# Patient Record
Sex: MALE | Race: WHITE | NOT HISPANIC OR LATINO | Employment: FULL TIME | URBAN - METROPOLITAN AREA
[De-identification: names, ages, dates, MRNs, and addresses within clinical notes are randomized per-mention and may not be internally consistent; named-entity substitution may affect disease eponyms.]

---

## 2017-02-13 ENCOUNTER — OFFICE VISIT (OUTPATIENT)
Dept: PEDIATRICS | Facility: CLINIC | Age: 52
End: 2017-02-13
Payer: COMMERCIAL

## 2017-02-13 VITALS
HEIGHT: 70 IN | TEMPERATURE: 98.3 F | OXYGEN SATURATION: 97 % | SYSTOLIC BLOOD PRESSURE: 138 MMHG | HEART RATE: 89 BPM | DIASTOLIC BLOOD PRESSURE: 80 MMHG | BODY MASS INDEX: 21.47 KG/M2 | WEIGHT: 150 LBS

## 2017-02-13 DIAGNOSIS — R05.9 COUGH: ICD-10-CM

## 2017-02-13 DIAGNOSIS — B34.9 VIRAL SYNDROME: Primary | ICD-10-CM

## 2017-02-13 LAB
FLUAV+FLUBV AG SPEC QL: NORMAL
FLUAV+FLUBV AG SPEC QL: NORMAL
SPECIMEN SOURCE: NORMAL

## 2017-02-13 PROCEDURE — 87804 INFLUENZA ASSAY W/OPTIC: CPT | Performed by: INTERNAL MEDICINE

## 2017-02-13 PROCEDURE — 99213 OFFICE O/P EST LOW 20 MIN: CPT | Performed by: INTERNAL MEDICINE

## 2017-02-13 NOTE — LETTER
[facility]  [address]  [address]  [place] [id number]        February 13, 2017    RE:    Miltno Gonzalez                                                                                     [address]   [facility] [address]            To whom it may concern:    Milton Gonzalez is under my professional care and was seen in the office today.  Off work due to illness on the following dates: February 13 and 14.          Sincerely,        Eldon Jimenez MD

## 2017-02-13 NOTE — PATIENT INSTRUCTIONS
Symptom management for cough and upper respiratory symptoms:    Sore throat: Warm salt water gargle as needed    Cough: Guaifenesin- an expectorant that helps to thin mucus and allow it to be cleared more easily.  Dextromethorphan helps to suppress cough.    Nasal and sinus congestion and drainage: Decongestants such as pseudoephedrine or phenylephrine help to reduce secretions and relieve stuffiness and pressure-related discomfort. Nasal saline spray can also be used to relieve sinus congestion as well.  Oxymetazoline (Afrin) spray can be used up to 3 days to manage nasal stuffiness.    Muscle aches and headache: Both acetaminophen and ibuprofen are effective-ibuprofen is slightly more effective for muscle aches and headache.  Ibuprofen should always be taken with food and plenty of fluids.    The typical course for a viral upper respiratory infection is 7-10 days. A dry cough may linger for 1- 2 weeks more. We encourage patients to call if fevers greater than 101.5 or if cough and other symptoms are much worse despite these symptomatic cares.

## 2017-02-17 ENCOUNTER — OFFICE VISIT (OUTPATIENT)
Dept: URGENT CARE | Facility: URGENT CARE | Age: 52
End: 2017-02-17
Payer: COMMERCIAL

## 2017-02-17 VITALS
HEART RATE: 80 BPM | DIASTOLIC BLOOD PRESSURE: 68 MMHG | SYSTOLIC BLOOD PRESSURE: 128 MMHG | RESPIRATION RATE: 14 BRPM | BODY MASS INDEX: 22.07 KG/M2 | TEMPERATURE: 98.5 F | WEIGHT: 153.8 LBS | OXYGEN SATURATION: 98 %

## 2017-02-17 DIAGNOSIS — R05.9 COUGH: Primary | ICD-10-CM

## 2017-02-17 PROCEDURE — 99213 OFFICE O/P EST LOW 20 MIN: CPT | Performed by: FAMILY MEDICINE

## 2017-02-17 RX ORDER — DOXYCYCLINE 100 MG/1
100 CAPSULE ORAL 2 TIMES DAILY
Qty: 20 CAPSULE | Refills: 0 | Status: SHIPPED | OUTPATIENT
Start: 2017-02-17 | End: 2017-02-27

## 2017-02-17 RX ORDER — CODEINE PHOSPHATE AND GUAIFENESIN 10; 100 MG/5ML; MG/5ML
1-2 SOLUTION ORAL EVERY 6 HOURS PRN
Qty: 120 ML | Refills: 0 | Status: SHIPPED | OUTPATIENT
Start: 2017-02-17 | End: 2018-09-19

## 2017-02-17 RX ORDER — BENZONATATE 100 MG/1
200 CAPSULE ORAL 3 TIMES DAILY PRN
Qty: 42 CAPSULE | Refills: 3 | Status: SHIPPED | OUTPATIENT
Start: 2017-02-17 | End: 2018-09-19

## 2017-02-17 NOTE — PATIENT INSTRUCTIONS
Humidifier, Steam for the lungs    follow up with the primary care provider if not better in 10 days.     Fill the printed prescription for Doxycycline in 3 days if not better.     I recommend that you quit smoking in the future.

## 2017-02-17 NOTE — PROGRESS NOTES
SUBJECTIVE:   iMlton Gonzalez is a 51 year old male smoker  presenting with a chief complaint of cough (productive of yellow mucous phlegm, coughing attacks have been severe at times, and mostly mild attacks, cough is throughout the day).  Patient did have a fever earlier this week; however, patient has not reported any more fevers.  .Patient has had a decreased appetite over the past week.    Onset of symptoms was 6 days ago.  Course of illness is worsening today. .    Current and Associated symptoms: as listed above.   Treatment measures tried include Mucinex, and Aleve (last taken this morning) and Robitussin DM.  Predisposing factors include Patient smokes.    Patent was evaluated by his primary care provider on 2/13/2017 for cough, fever, runny nose, congestion.  Lung exam was clear.  The oropharynx was mildly erythematous.  Patient's Influenza Test was negative.  Patient was diagnosed with viral syndrome and the treatment plan was symptomatic care, fluids, rest.     Past Medical History   Diagnosis Date     Rotator cuff disorder 10/15/2008     Tobacco use disorder      Current Outpatient Prescriptions   Medication Sig Dispense Refill     Naproxen Sodium (ALEVE PO)        NO ACTIVE MEDICATIONS   0     Social History   Substance Use Topics     Smoking status: Current Every Day Smoker     Packs/day: 1.00     Years: 24.00     Types: Cigarettes     Last attempt to quit: 5/30/2008     Smokeless tobacco: Never Used     Alcohol use No       ROS:  Review of systems negative except as stated above.    OBJECTIVE  :/68 (BP Location: Right arm, Patient Position: Chair, Cuff Size: Adult Regular)  Pulse 80  Temp 98.5  F (36.9  C) (Tympanic)  Resp 14  Wt 153 lb 12.8 oz (69.8 kg)  SpO2 98%  BMI 22.07 kg/m2  GENERAL APPEARANCE: healthy, alert and no distress.  No acute respiratory distress.    HENT: mouth without ulcers, erythema or lesions.    NECK: supple, nontender, no lymphadenopathy  RESP: rhonchi right mid  posterior  CV: regular rates and rhythm, normal S1 S2, no murmur noted  SKIN: pink and warm.     ASSESSMENT:  Cough  Acute Bronchitis    PLAN:  Rx:  Tessalon Perles, Cheratussin AC  Fill the printed Rx for Doxycycline if not better in 3 days.   I recommended that the patient quit smoking in the future.   See orders in Epic  Steam, Humidifier  Don't Mucinex with the Cheratussin AC  follow up with the primary care provider if not better in 10 days.     Jeyson White MD

## 2017-02-17 NOTE — NURSING NOTE
"Chief Complaint   Patient presents with     Urgent Care     Cough     pt states has been sick with a cough x 1 week. States was seen by primary last week and was negative for flu. Pt has taken mucinex and aleve for symptoms.        Initial /68 (BP Location: Right arm, Patient Position: Chair, Cuff Size: Adult Regular)  Pulse 80  Temp 98.5  F (36.9  C) (Tympanic)  Wt 153 lb 12.8 oz (69.8 kg)  SpO2 98%  BMI 22.07 kg/m2 Estimated body mass index is 22.07 kg/(m^2) as calculated from the following:    Height as of 2/13/17: 5' 10\" (1.778 m).    Weight as of this encounter: 153 lb 12.8 oz (69.8 kg).  Medication Reconciliation: unable or not appropriate to perform   Marissa Garcia CMA (AAMA) 2/17/2017 10:48 AM    "

## 2017-02-17 NOTE — LETTER
[facility]  [address]  [address]  [place] [facility] [id number] [phone]      February 17, 2017    RE:  Milton Gonzalez                                                                                                                                                       [address]   [facility] [address]            To whom it may concern:    Milton Gonzalez is under my professional care at the [facility] on February 17, 2017.  Because of his current medical illness, please excuse his absences from work on February 17 and February 18, 2017.   He  may return to work once he starts to feel better.         Sincerely,        Jeyson White MD    [facility]

## 2017-02-17 NOTE — MR AVS SNAPSHOT
After Visit Summary   2/17/2017    Milton Gonzalez    MRN: [id number]           Patient Information     Date Of Birth          1965        Visit Information        Provider Department      2/17/2017 10:45 AM Jeyson White MD [facility]        Today's Diagnoses     Cough    -  1      Care Instructions    Humidifier, Steam for the lungs    follow up with the primary care provider if not better in 10 days.     Fill the printed prescription for Doxycycline in 3 days if not better.     I recommend that you quit smoking in the future.                Follow-ups after your visit        Who to contact     If you have questions or need follow up information about today's clinic visit or your schedule please contact [facility] directly at [phone].  Normal or non-critical lab and imaging results will be communicated to you by [employer], letter or phone within 4 business days after the clinic has received the results. If you do not hear from us within 7 days, please contact the clinic through [employer] or phone. If you have a critical or abnormal lab result, we will notify you by phone as soon as possible.  Submit refill requests through [employer] or call your pharmacy and they will forward the refill request to us. Please allow 3 business days for your refill to be completed.          Additional Information About Your Visit        MyChart Information     [employer] gives you secure access to your electronic health record. If you see a primary care provider, you can also send messages to your care team and make appointments. If you have questions, please call your primary care clinic.  If you do not have a primary care provider, please call [phone] and they will assist you.        Care EveryWhere ID     This is your Care EveryWhere ID. This could be used by other organizations to access your [facility] medical records  [id number]        Your Vitals Were     Pulse Temperature  Respirations Pulse Oximetry BMI (Body Mass Index)       80 98.5  F (36.9  C) (Tympanic) 14 98% 22.07 kg/m2        Blood Pressure from Last 3 Encounters:   02/17/17 128/68   02/13/17 138/80   10/25/16 124/80    Weight from Last 3 Encounters:   02/17/17 153 lb 12.8 oz (69.8 kg)   02/13/17 150 lb (68 kg)   10/25/16 153 lb 8 oz (69.6 kg)              Today, you had the following     No orders found for display         Today's Medication Changes          These changes are accurate as of: 2/17/17 11:11 AM.  If you have any questions, ask your nurse or doctor.               Start taking these medicines.        Dose/Directions    benzonatate 100 MG capsule   Commonly known as:  TESSALON   Used for:  Cough   Started by:  Jeyson White MD        Dose:  200 mg   Take 2 capsules (200 mg) by mouth 3 times daily as needed   Quantity:  42 capsule   Refills:  3       doxycycline Monohydrate 100 MG Caps   Used for:  Cough   Started by:  Jeyson White MD        Dose:  100 mg   Take 1 capsule (100 mg) by mouth 2 times daily for 10 days   Quantity:  20 capsule   Refills:  0       guaiFENesin-codeine 100-10 MG/5ML Soln solution   Commonly known as:  ROBITUSSIN AC   Used for:  Cough   Started by:  Jeyson White MD        Dose:  1-2 tsp.   Take 5-10 mLs by mouth every 6 hours as needed for cough   Quantity:  120 mL   Refills:  0            Where to get your medicines      Some of these will need a paper prescription and others can be bought over the counter.  Ask your nurse if you have questions.     Bring a paper prescription for each of these medications     benzonatate 100 MG capsule    doxycycline Monohydrate 100 MG Caps    guaiFENesin-codeine 100-10 MG/5ML Soln solution                Primary Care Provider Office Phone # Fax #    Eldon Jimenez [phone]       [address] [place] [address]        Thank you!     Thank you for choosing [facility]  for your care.  Our goal is always to provide you with excellent care. Hearing back from our patients is one way we can continue to improve our services. Please take a few minutes to complete the written survey that you may receive in the mail after your visit with us. Thank you!             Your Updated Medication List - Protect others around you: Learn how to safely use, store and throw away your medicines at www.disposemymeds.org.          This list is accurate as of: 2/17/17 11:11 AM.  Always use your most recent med list.                   Brand Name Dispense Instructions for use    ALEVE PO          benzonatate 100 MG capsule    TESSALON    42 capsule    Take 2 capsules (200 mg) by mouth 3 times daily as needed       doxycycline Monohydrate 100 MG Caps     20 capsule    Take 1 capsule (100 mg) by mouth 2 times daily for 10 days       guaiFENesin-codeine 100-10 MG/5ML Soln solution    ROBITUSSIN AC    120 mL    Take 5-10 mLs by mouth every 6 hours as needed for cough       NO ACTIVE MEDICATIONS

## 2017-09-01 ENCOUNTER — HOSPITAL ENCOUNTER (EMERGENCY)
Facility: CLINIC | Age: 52
Discharge: HOME | End: 2017-09-01
Attending: EMERGENCY MEDICINE | Admitting: EMERGENCY MEDICINE

## 2017-09-01 VITALS
RESPIRATION RATE: 16 BRPM | TEMPERATURE: 98.2 F | DIASTOLIC BLOOD PRESSURE: 88 MMHG | BODY MASS INDEX: 22.33 KG/M2 | HEIGHT: 70 IN | OXYGEN SATURATION: 100 % | SYSTOLIC BLOOD PRESSURE: 142 MMHG | WEIGHT: 156 LBS

## 2017-09-01 DIAGNOSIS — H57.89 EYE IRRITATION: ICD-10-CM

## 2017-09-01 PROCEDURE — 99283 EMERGENCY DEPT VISIT LOW MDM: CPT

## 2017-09-01 RX ORDER — ERYTHROMYCIN 5 MG/G
1 OINTMENT OPHTHALMIC 3 TIMES DAILY
Qty: 3.5 G | Refills: 0 | Status: SHIPPED | OUTPATIENT
Start: 2017-09-01 | End: 2018-09-19

## 2017-09-01 RX ORDER — PROPARACAINE HYDROCHLORIDE 5 MG/ML
1 SOLUTION/ DROPS OPHTHALMIC ONCE
Status: DISCONTINUED | OUTPATIENT
Start: 2017-09-01 | End: 2017-09-01 | Stop reason: HOSPADM

## 2017-09-01 ASSESSMENT — ENCOUNTER SYMPTOMS
EYE PAIN: 1
EYE REDNESS: 1

## 2017-09-01 ASSESSMENT — VISUAL ACUITY
OD: 20/25
OS: 20/20

## 2017-09-01 NOTE — ED PROVIDER NOTES
"  History     Chief Complaint:  Eye Problem    HPI   Milton Gonzalez is a 51 year old male who presents to the emergency department today for evaluation of an eye problem. 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 He reports that he immediately went to the flush station and was there for around 20 to 25 minutes. The patient reports that the burning sensation has dissipated but the eye still feels irritated. The patient denies any vision change.    Allergies:  Penicillins      Medications:    The patient is currently on no regular medications.     Past Medical History:    Rotator cuff disorder   Tobacco use disorder     Past Surgical History:    Tonsillectomy    Family History:    Diabetes  Hypertension  Cerebrovascular Disease    Social History:  The patient was alone.  Smoking Status: Current Every Day Smoker  Smokeless Tobacco: Never Used  Alcohol Use: No   Marital Status:       Review of Systems   Eyes: Positive for pain and redness. Negative for visual disturbance.   All other systems reviewed and are negative.    Physical Exam   First Vitals:  BP: 146/85  Heart Rate: 71  Temp: 98.2  F (36.8  C)  Resp: 16  Height: 177.8 cm (5' 10\")  Weight: 70.8 kg (156 lb)  SpO2: 100 %    Physical Exam  General: Appears well-developed and well-nourished.   Head: No signs of trauma.   Mouth/Throat: Oropharynx is clear and moist.   Eyes: Mild injection of right eye without signs of ulceration.  PH 7.  Pupils are equal, round, and reactive to light.   Neck: Normal range of motion. No nuchal rigidity. No cervical adenopathy  CV: Normal rate and regular rhythm.    Resp: Effort normal and breath sounds normal. No respiratory distress.   MSK: Normal range of motion. no edema. No Calf tenderness.  Neuro: The patient is alert and oriented.  Speech " normal.  GCS 15  Skin: Skin is warm and dry. No rash noted.   Psych: normal mood and affect. behavior is normal.     Emergency Department Course     Interventions:  Alcaine 1% opthalmic solution 1 drop     Emergency Department Course:  Nursing notes and vitals reviewed.  1043: I performed an exam of the patient as documented above.   1056: Patient rechecked and updated.   1214: Patient rechecked and updated.   1227: Patient rechecked and updated.   I discussed the treatment plan with the patient. They expressed understanding of this plan and consented to discharge. They will be discharged home with instructions for care and follow up. In addition, the patient will return to the emergency department if their symptoms persist, worsen, if new symptoms arise or if there is any concern.  All questions were answered.   I personally reviewed the laboratory results with the Patient and answered all related questions prior to discharge.     Impression & Plan      Medical Decision Making:  Milton Gonzalez is a 51 year old gentleman who presents due to getting a chemical in his right eye.  This occurred while at work, and he did flush his eye and in an eye station for approximately 20 minutes prior to arrival.  He still felt some irritation, so he came to the ER.  On my evaluation, he did have some slight injection to the right eye, but there are no signs of any foreign body or ulceration.  I did flush the eye further with a Leonard lens, which the patient tolerated well.  Following this, the pH was normal in the eye.  In this setting, I feel he is appropriate for discharge home.  He was given a prescription for erythromycin ointment as this should help protect the cornea along with being soothing to the eye.  He was recommended a follow-up with ophthalmology, return to the ER for any new or worsening symptoms, or further concerns.    Diagnosis:    ICD-10-CM    1. Eye irritation H57.8        Disposition:  discharged to home with  below prescription    Discharge Medications:  New Prescriptions    ERYTHROMYCIN (ROMYCIN) OPHTHALMIC OINTMENT    Place 1 Application into the right eye 3 times daily     Scribe Disclosure:  I, Asiya Ratliff, am serving as a scribe at 10:43 AM on 9/1/2017 to document services personally performed by Tyrone Burns MD based on my observations and the provider's statements to me.    9/1/2017    EMERGENCY DEPARTMENT       Tyrone Burns MD  09/01/17 [id number]

## 2017-09-01 NOTE — ED AVS SNAPSHOT
Emergency Department    [address] [facility] [address]    Phone:  [phone]    Fax:  [phone]                                       Milton Gonzalez   MRN: [id number]    Department:   Emergency Department   Date of Visit:  9/1/2017           Patient Information     Date Of Birth          1965        Your diagnoses for this visit were:     Eye irritation        You were seen by Tyrone Burns MD.      Follow-up Information     Call [place], Curtis MC MD.    Specialty:  Ophthalmology    Why:  As needed    Contact information:    [facility]  [address]  [facility] [address] [phone]          Follow up with  Emergency Department.    Specialty:  EMERGENCY MEDICINE    Why:  As needed, If symptoms worsen    Contact information:    [address] [facility] [address] [phone]        Discharge Instructions         Chemical Exposure: Eye     A chemical exposure, or injury, to the eye can occur when an acid or base solution comes in contact with your eye. As soon as the chemical gets in your eye, it is very important to flush (irrigate) your eye with sterile saline solution right away. If you don't have sterile saline, it is OK to use tap water instead. Chemical exposure can cause anything from mild irritation to permanent scarring and vision loss. How serious the injury is depends on what type of chemical it was, how concentrated it was, whether it was an acid or a base, and how long it was in your eye. After you flush the eye, it is important to follow up with a doctor if you have blurred vision or pain.  It is common to have some irritation for the next 24 hours, even in mild cases. If the exposure was more serious, be sure to follow up as directed.  The pressure inside of the eye can increase (glaucoma) hours to days after a chemical eye injury. This requires prompt treatment. Watch for the symptoms described below.  Home care    You can put  a cold pack (ice in a plastic bag, wrapped in a towel) over the eye for 20 minutes at a time. This will reduce pain.    Eye drops may be prescribed to reduce irritation, inflammation, and risk of infection. If no drops were prescribed, you may use over-the-counter decongestant eye drops for irritation or redness.    You may use acetaminophen or ibuprofen to control pain, unless another medicine was prescribed. If you have chronic liver or kidney disease or have ever had a stomach ulcer or gastrointestinal bleeding, talk with your doctor before using these medicines.    If an eye patch was put on:    You may put the cold pack over the eye patch.    If you were given a follow-up appointment to have the patch removed and the eye examined, don't miss it. An eye patch should not be left in place for more than 48 hours, unless you are advised to do so by your healthcare provider.    Do not drive a motor vehicle or use machinery with the eye patch in place. It is difficult to  distance with only one eye.  Follow-up care  Follow up with your healthcare provider, or as directed.  When to seek medical advice  Call your healthcare provider right away if any of these occur.    Increased eyelid swelling    Increasing pain in the eye    Increasing redness or drainage from the eye    Failure of normal vision to return within 24 to 48 hours    Continued feeling that something is in your eye, lasting more than 48 hours  Date Last Reviewed: 6/14/2015 2000-2017 [employer]. [address] [place] [address]. All rights reserved. This information is not intended as a substitute for professional medical care. Always follow your healthcare professional's instructions.          24 Hour Appointment Hotline       To make an appointment at any [facility], call [phone] ([phone]). If you don't have a family doctor or clinic, we will help you find one. [facility] are conveniently located to  serve the needs of you and your family.             Review of your medicines      START taking        Dose / Directions Last dose taken    erythromycin ophthalmic ointment   Commonly known as:  ROMYCIN   Dose:  1 Application   Quantity:  3.5 g        Place 1 Application into the right eye 3 times daily   Refills:  0          Our records show that you are taking the medicines listed below. If these are incorrect, please call your family doctor or clinic.        Dose / Directions Last dose taken    ALEVE PO        Refills:  0        benzonatate 100 MG capsule   Commonly known as:  TESSALON   Dose:  200 mg   Quantity:  42 capsule        Take 2 capsules (200 mg) by mouth 3 times daily as needed   Refills:  3        guaiFENesin-codeine 100-10 MG/5ML Soln solution   Commonly known as:  ROBITUSSIN AC   Dose:  1-2 tsp.   Quantity:  120 mL        Take 5-10 mLs by mouth every 6 hours as needed for cough   Refills:  0        NO ACTIVE MEDICATIONS        Refills:  0                Prescriptions were sent or printed at these locations (1 Prescription)                   Other Prescriptions                Printed at Department/Unit printer (1 of 1)         erythromycin (ROMYCIN) ophthalmic ointment                Orders Needing Specimen Collection     None      Pending Results     No orders found from 8/30/2017 to 9/2/2017.            Pending Culture Results     No orders found from 8/30/2017 to 9/2/2017.            Pending Results Instructions     If you had any lab results that were not finalized at the time of your Discharge, you can call the ED Lab Result RN at [phone]. You will be contacted by this team for any positive Lab results or changes in treatment. The nurses are available 7 days a week from 10A to 6:30P.  You can leave a message 24 hours per day and they will return your call.        Test Results From Your Hospital Stay               Clinical Quality Measure: Blood Pressure Screening     Your blood pressure was  checked while you were in the emergency department today. The last reading we obtained was  BP: 146/85 . Please read the guidelines below about what these numbers mean and what you should do about them.  If your systolic blood pressure (the top number) is less than 120 and your diastolic blood pressure (the bottom number) is less than 80, then your blood pressure is normal. There is nothing more that you need to do about it.  If your systolic blood pressure (the top number) is 120-139 or your diastolic blood pressure (the bottom number) is 80-89, your blood pressure may be higher than it should be. You should have your blood pressure rechecked within a year by a primary care provider.  If your systolic blood pressure (the top number) is 140 or greater or your diastolic blood pressure (the bottom number) is 90 or greater, you may have high blood pressure. High blood pressure is treatable, but if left untreated over time it can put you at risk for heart attack, stroke, or kidney failure. You should have your blood pressure rechecked by a primary care provider within the next 4 weeks.  If your provider in the emergency department today gave you specific instructions to follow-up with your doctor or provider even sooner than that, you should follow that instruction and not wait for up to 4 weeks for your follow-up visit.        Thank you for choosing [facility]       Thank you for choosing [facility] for your care. Our goal is always to provide you with excellent care. Hearing back from our patients is one way we can continue to improve our services. Please take a few minutes to complete the written survey that you may receive in the mail after you visit with us. Thank you!        [employer] Information     [email or web address] gives you secure access to your electronic health record. If you see a primary care provider, you can also send messages to your care team and make appointments. If you have questions, please call your primary care  clinic.  If you do not have a primary care provider, please call [phone] and they will assist you.        Care EveryWhere ID     This is your Care EveryWhere ID. This could be used by other organizations to access your [facility] medical records  [id number]        Equal Access to Services     ACACIA STOVER : Anmol Diaz, melody busby, etelvina greeralcarlos marie, thang cole. So [facility] [phone].    ATENCIÓN: Si habla español, tiene a lorenzana disposición servicios gratuitos de asistencia lingüística. Llame al [phone].    We comply with applicable federal civil rights laws and [place] laws. We do not discriminate on the basis of race, color, national origin, age, disability sex, sexual orientation or gender identity.            After Visit Summary       This is your record. Keep this with you and show to your community pharmacist(s) and doctor(s) at your next visit.

## 2017-09-01 NOTE — ED AVS SNAPSHOT
Emergency Department    [address] [place] [address]    Phone:  [phone]    Fax:  [phone]                                       Milton Gonzalez   MRN: [id number]    Department:   Emergency Department   Date of Visit:  9/1/2017           After Visit Summary Signature Page     I have received my discharge instructions, and my questions have been answered. I have discussed any challenges I see with this plan with the nurse or doctor.    ..........................................................................................................................................  Patient/Patient Representative Signature      ..........................................................................................................................................  Patient Representative Print Name and Relationship to Patient    ..................................................               ................................................  Date                                            Time    ..........................................................................................................................................  Reviewed by Signature/Title    ...................................................              ..............................................  Date                                                            Time

## 2017-09-01 NOTE — DISCHARGE INSTRUCTIONS
Chemical Exposure: Eye     A chemical exposure, or injury, to the eye can occur when an acid or base solution comes in contact with your eye. As soon as the chemical gets in your eye, it is very important to flush (irrigate) your eye with sterile saline solution right away. If you don't have sterile saline, it is OK to use tap water instead. Chemical exposure can cause anything from mild irritation to permanent scarring and vision loss. How serious the injury is depends on what type of chemical it was, how concentrated it was, whether it was an acid or a base, and how long it was in your eye. After you flush the eye, it is important to follow up with a doctor if you have blurred vision or pain.  It is common to have some irritation for the next 24 hours, even in mild cases. If the exposure was more serious, be sure to follow up as directed.  The pressure inside of the eye can increase (glaucoma) hours to days after a chemical eye injury. This requires prompt treatment. Watch for the symptoms described below.  Home care    You can put a cold pack (ice in a plastic bag, wrapped in a towel) over the eye for 20 minutes at a time. This will reduce pain.    Eye drops may be prescribed to reduce irritation, inflammation, and risk of infection. If no drops were prescribed, you may use over-the-counter decongestant eye drops for irritation or redness.    You may use acetaminophen or ibuprofen to control pain, unless another medicine was prescribed. If you have chronic liver or kidney disease or have ever had a stomach ulcer or gastrointestinal bleeding, talk with your doctor before using these medicines.    If an eye patch was put on:    You may put the cold pack over the eye patch.    If you were given a follow-up appointment to have the patch removed and the eye examined, don't miss it. An eye patch should not be left in place for more than 48 hours, unless you are advised to do so by your healthcare provider.    Do not  drive a motor vehicle or use machinery with the eye patch in place. It is difficult to  distance with only one eye.  Follow-up care  Follow up with your healthcare provider, or as directed.  When to seek medical advice  Call your healthcare provider right away if any of these occur.    Increased eyelid swelling    Increasing pain in the eye    Increasing redness or drainage from the eye    Failure of normal vision to return within 24 to 48 hours    Continued feeling that something is in your eye, lasting more than 48 hours  Date Last Reviewed: 6/14/2015 2000-2017 [employer] [address] [place] [address]. All rights reserved. This information is not intended as a substitute for professional medical care. Always follow your healthcare professional's instructions.

## 2018-07-02 ENCOUNTER — OFFICE VISIT (OUTPATIENT)
Dept: URGENT CARE | Facility: URGENT CARE | Age: 53
End: 2018-07-02
Payer: OTHER MISCELLANEOUS

## 2018-07-02 VITALS
DIASTOLIC BLOOD PRESSURE: 92 MMHG | SYSTOLIC BLOOD PRESSURE: 151 MMHG | RESPIRATION RATE: 16 BRPM | OXYGEN SATURATION: 98 % | HEART RATE: 67 BPM | TEMPERATURE: 97.7 F

## 2018-07-02 DIAGNOSIS — S09.90XA INJURY OF HEAD, INITIAL ENCOUNTER: Primary | ICD-10-CM

## 2018-07-02 DIAGNOSIS — S01.01XA LACERATION OF SCALP, INITIAL ENCOUNTER: ICD-10-CM

## 2018-07-02 PROCEDURE — 12001 RPR S/N/AX/GEN/TRNK 2.5CM/<: CPT | Performed by: FAMILY MEDICINE

## 2018-07-02 PROCEDURE — 99213 OFFICE O/P EST LOW 20 MIN: CPT | Mod: 25 | Performed by: FAMILY MEDICINE

## 2018-07-02 NOTE — LETTER
[facility]  [address] [id number] [phone]      July 2, 2018    RE:  Milton Gonzalez                                                                                                                                                       [address]   [facility] [address]            To whom it may concern:    Milton Gonzalez is under my professional care for    Injury of head, initial encounter  Laceration of scalp, initial encounter.   He  may return to work with the following: No working or lifting restrictions on or about 7-3-18.          Sincerely,        Wing Isaacs    [facility]

## 2018-07-02 NOTE — NURSING NOTE
The following medication was given:     MEDICATION: lidocaine  4%  ROUTE: Topical gel   SITE: Head   DOSE: 1.5 mL  LOT #: [id number]  :  [employer]   EXPIRATION DATE:  07/19/2018  NDC#: 6551-4211-55

## 2018-07-02 NOTE — PROGRESS NOTES
SUBJECTIVE: @[email or web address] who presents to the clinic with a laceration on the hand and scalp sustained 1 hour(s) ago.    This is a work related and accidental injury.    Mechanism of injury: blunt trauma (contusion).  Associated symptoms: Denies numbness, weakness, or loss of function  Last tetanus booster within 10 years: yes    Past Medical History:   Diagnosis Date     Rotator cuff disorder 10/15/2008     Tobacco use disorder      Allergies   Allergen Reactions     Penicillins      As child     Social History   Substance Use Topics     Smoking status: Current Every Day Smoker     Packs/day: 1.00     Years: 24.00     Types: Cigarettes     Last attempt to quit: 5/30/2008     Smokeless tobacco: Never Used     Alcohol use No       ROS:  Neuro: good distal sensation  Motor: normal rom and strenght  Hem: capillary refill < 2 sec    EXAM: The patient appears today in alert,no apparent distress distressVITALS:   BP (!) 151/92  Pulse 67  Temp 97.7  F (36.5  C) (Tympanic)  Resp 16  SpO2 98%  Size of laceration: 2 centimeters  Characteristics of the laceration: clean and straight  Tendon function intact: yes  Sensation to light touch intact: yes  Pulses/Capillary refill intact: yes      ICD-10-CM    1. Injury of head, initial encounter S09.90XA    2. Laceration of scalp, initial encounter S01.01XA REPAIR SUPERFICIAL, WOUND BODY < =2.5CM       Procedure Note:LET applied  Good anesthesia was obtainedPrepped and draped in the usual sterile fashion  Wound cleaned with sterile waterLaceration was closed using 4 staples  interrupted sutures  After care instructions:Keep wound clean   Staples out in 10 days  Signs of infection discussed today

## 2018-07-02 NOTE — MR AVS SNAPSHOT
After Visit Summary   7/2/2018    Milton Gonzalez    MRN: [id number]           Patient Information     Date Of Birth          1965        Visit Information        Provider Department      7/2/2018 12:20 PM Wing Isaacs,  [facility]        Today's Diagnoses     Injury of head, initial encounter    -  1    Laceration of scalp, initial encounter           Follow-ups after your visit        Who to contact     If you have questions or need follow up information about today's clinic visit or your schedule please contact [facility] directly at [phone].  Normal or non-critical lab and imaging results will be communicated to you by impokhart, letter or phone within 4 business days after the clinic has received the results. If you do not hear from us within 7 days, please contact the clinic through impokhart or phone. If you have a critical or abnormal lab result, we will notify you by phone as soon as possible.  Submit refill requests through [employer] or call your pharmacy and they will forward the refill request to us. Please allow 3 business days for your refill to be completed.          Additional Information About Your Visit        MyChart Information     [employer] gives you secure access to your electronic health record. If you see a primary care provider, you can also send messages to your care team and make appointments. If you have questions, please call your primary care clinic.  If you do not have a primary care provider, please call [phone] and they will assist you.        Care EveryWhere ID     This is your Care EveryWhere ID. This could be used by other organizations to access your [facility] medical records  [id number]        Your Vitals Were     Pulse Temperature Respirations Pulse Oximetry          67 97.7  F (36.5  C) (Tympanic) 16 98%         Blood Pressure from Last 3 Encounters:   07/02/18 (!) 151/92   09/01/17 142/88    02/17/17 128/68    Weight from Last 3 Encounters:   09/01/17 156 lb (70.8 kg)   02/17/17 153 lb 12.8 oz (69.8 kg)   02/13/17 150 lb (68 kg)              Today, you had the following     No orders found for display       Primary Care Provider Office Phone # Fax #    Eldon Jimenez [phone] [address] [place] [address]        Equal Access to Services     [facility] : Hadii aad ku hadasho Soomaali, waaxda luqadaha, qaybta kaalmada adeegyada, waxay idiin hayaan adeeg kharash la'aan ah. So [facility] [phone].    ATENCIÓN: Si habla español, tiene a lorenzana disposición servicios gratuitos de asistencia lingüística. [facility] [phone].    We comply with applicable federal civil rights laws and [place] laws. We do not discriminate on the basis of race, color, national origin, age, disability, sex, sexual orientation, or gender identity.            Thank you!     Thank you for choosing [facility]  for your care. Our goal is always to provide you with excellent care. Hearing back from our patients is one way we can continue to improve our services. Please take a few minutes to complete the written survey that you may receive in the mail after your visit with us. Thank you!             Your Updated Medication List - Protect others around you: Learn how to safely use, store and throw away your medicines at www.disposemymeds.org.          This list is accurate as of 7/2/18  1:05 PM.  Always use your most recent med list.                   Brand Name Dispense Instructions for use Diagnosis    ALEVE PO           benzonatate 100 MG capsule    TESSALON    42 capsule    Take 2 capsules (200 mg) by mouth 3 times daily as needed    Cough       erythromycin ophthalmic ointment    ROMYCIN    3.5 g    Place 1 Application into the right eye 3 times daily        guaiFENesin-codeine 100-10 MG/5ML Soln solution    ROBITUSSIN AC    120 mL    Take 5-10 mLs by mouth  every 6 hours as needed for cough    Cough       NO ACTIVE MEDICATIONS

## 2018-07-13 ENCOUNTER — OFFICE VISIT (OUTPATIENT)
Dept: URGENT CARE | Facility: URGENT CARE | Age: 53
End: 2018-07-13
Payer: OTHER MISCELLANEOUS

## 2018-07-13 VITALS
TEMPERATURE: 98.2 F | HEART RATE: 88 BPM | WEIGHT: 149 LBS | DIASTOLIC BLOOD PRESSURE: 68 MMHG | RESPIRATION RATE: 16 BRPM | SYSTOLIC BLOOD PRESSURE: 108 MMHG | BODY MASS INDEX: 21.38 KG/M2

## 2018-07-13 DIAGNOSIS — Z48.02 ENCOUNTER FOR STAPLE REMOVAL: Primary | ICD-10-CM

## 2018-07-13 PROCEDURE — 99024 POSTOP FOLLOW-UP VISIT: CPT | Performed by: FAMILY MEDICINE

## 2018-07-13 NOTE — MR AVS SNAPSHOT
After Visit Summary   7/13/2018    Milton Gonzalez    MRN: [id number]           Patient Information     Date Of Birth          1965        Visit Information        Provider Department      7/13/2018 12:40 PM Wild Paz MD [facility]        Today's Diagnoses     Encounter for staple removal    -  1       Follow-ups after your visit        Who to contact     If you have questions or need follow up information about today's clinic visit or your schedule please contact [facility] directly at [phone].  Normal or non-critical lab and imaging results will be communicated to you by [employer] Media[employer], letter or phone within 4 business days after the clinic has received the results. If you do not hear from us within 7 days, please contact the clinic through [employer] or phone. If you have a critical or abnormal lab result, we will notify you by phone as soon as possible.  Submit refill requests through [employer] or call your pharmacy and they will forward the refill request to us. Please allow 3 business days for your refill to be completed.          Additional Information About Your Visit        MyChart Information     [employer] gives you secure access to your electronic health record. If you see a primary care provider, you can also send messages to your care team and make appointments. If you have questions, please call your primary care clinic.  If you do not have a primary care provider, please call [phone] and they will assist you.        Care EveryWhere ID     This is your Care EveryWhere ID. This could be used by other organizations to access your [facility] medical records  [id number]        Your Vitals Were     Pulse Temperature Respirations BMI (Body Mass Index)          88 98.2  F (36.8  C) (Tympanic) 16 21.38 kg/m2         Blood Pressure from Last 3 Encounters:   07/13/18 108/68   07/02/18 (!) 151/92   09/01/17 142/88    Weight from Last 3  Encounters:   07/13/18 149 lb (67.6 kg)   09/01/17 156 lb (70.8 kg)   02/17/17 153 lb 12.8 oz (69.8 kg)              Today, you had the following     No orders found for display       Primary Care Provider Office Phone # Fax #    Eldon Jimenez [phone] [address] [place] [address]        Equal Access to Services     [facility] : Hadii aad ku hadasho Soomaali, waaxda luqadaha, qaybta kaalmada adeegyada, waxay idiin hayaan adeeg kharash lakathi . So [facility] [phone].    ATENCIÓN: Si habla español, tiene a lorenzana disposición servicios gratuitos de asistencia lingüística. Llame al [phone].    We comply with applicable federal civil rights laws and [place] laws. We do not discriminate on the basis of race, color, national origin, age, disability, sex, sexual orientation, or gender identity.            Thank you!     Thank you for choosing [facility]  for your care. Our goal is always to provide you with excellent care. Hearing back from our patients is one way we can continue to improve our services. Please take a few minutes to complete the written survey that you may receive in the mail after your visit with us. Thank you!             Your Updated Medication List - Protect others around you: Learn how to safely use, store and throw away your medicines at www.disposemymeds.org.          This list is accurate as of 7/13/18 11:59 PM.  Always use your most recent med list.                   Brand Name Dispense Instructions for use Diagnosis    ALEVE PO           benzonatate 100 MG capsule    TESSALON    42 capsule    Take 2 capsules (200 mg) by mouth 3 times daily as needed    Cough       erythromycin ophthalmic ointment    ROMYCIN    3.5 g    Place 1 Application into the right eye 3 times daily        guaiFENesin-codeine 100-10 MG/5ML Soln solution    ROBITUSSIN AC    120 mL    Take 5-10 mLs by mouth every 6 hours as needed for cough     Cough       NO ACTIVE MEDICATIONS

## 2018-07-19 NOTE — PROGRESS NOTES
SUBJECTIVE:  Milton Gonzalez is a 52 year old male complains of sutures that had been in his head for 10 days.    He is having minimal pain some itching no drainage.  He is not having any fever chills .    OBJECTIVE:  /68  Pulse 88  Temp 98.2  F (36.8  C) (Tympanic)  Resp 16  Wt 149 lb (67.6 kg)  BMI 21.38 kg/m2  Exam; his examination today shows that he is suture in place top of his  Head    ASSESSMENT:  1.  Staple removal staples have been in for greater than 10 days     No evidence of infection wound has been healing well    PLAN:  1.  Taken to the procedure room there the area was cleaned prepped and we removed 4 staples without problem.

## 2018-09-19 ENCOUNTER — OFFICE VISIT (OUTPATIENT)
Dept: PEDIATRICS | Facility: CLINIC | Age: 53
End: 2018-09-19
Payer: COMMERCIAL

## 2018-09-19 VITALS
DIASTOLIC BLOOD PRESSURE: 78 MMHG | HEART RATE: 60 BPM | BODY MASS INDEX: 21.47 KG/M2 | HEIGHT: 70 IN | TEMPERATURE: 98.3 F | WEIGHT: 150 LBS | SYSTOLIC BLOOD PRESSURE: 132 MMHG

## 2018-09-19 DIAGNOSIS — F17.200 TOBACCO USE DISORDER: ICD-10-CM

## 2018-09-19 DIAGNOSIS — Z12.11 SCREENING FOR COLON CANCER: ICD-10-CM

## 2018-09-19 DIAGNOSIS — Z12.5 SCREENING FOR PROSTATE CANCER: ICD-10-CM

## 2018-09-19 DIAGNOSIS — Z23 NEED FOR PROPHYLACTIC VACCINATION AND INOCULATION AGAINST INFLUENZA: ICD-10-CM

## 2018-09-19 DIAGNOSIS — Z00.00 ENCOUNTER FOR ROUTINE ADULT HEALTH EXAMINATION WITHOUT ABNORMAL FINDINGS: Primary | ICD-10-CM

## 2018-09-19 DIAGNOSIS — Z13.6 CARDIOVASCULAR SCREENING; LDL GOAL LESS THAN 160: ICD-10-CM

## 2018-09-19 DIAGNOSIS — Z23 NEED FOR TETANUS BOOSTER: ICD-10-CM

## 2018-09-19 PROCEDURE — 90472 IMMUNIZATION ADMIN EACH ADD: CPT | Performed by: INTERNAL MEDICINE

## 2018-09-19 PROCEDURE — 80048 BASIC METABOLIC PNL TOTAL CA: CPT | Performed by: INTERNAL MEDICINE

## 2018-09-19 PROCEDURE — 83718 ASSAY OF LIPOPROTEIN: CPT | Performed by: INTERNAL MEDICINE

## 2018-09-19 PROCEDURE — 99213 OFFICE O/P EST LOW 20 MIN: CPT | Mod: 25 | Performed by: INTERNAL MEDICINE

## 2018-09-19 PROCEDURE — 90715 TDAP VACCINE 7 YRS/> IM: CPT | Performed by: INTERNAL MEDICINE

## 2018-09-19 PROCEDURE — 90471 IMMUNIZATION ADMIN: CPT | Performed by: INTERNAL MEDICINE

## 2018-09-19 PROCEDURE — 82465 ASSAY BLD/SERUM CHOLESTEROL: CPT | Performed by: INTERNAL MEDICINE

## 2018-09-19 PROCEDURE — 36415 COLL VENOUS BLD VENIPUNCTURE: CPT | Performed by: INTERNAL MEDICINE

## 2018-09-19 PROCEDURE — G0103 PSA SCREENING: HCPCS | Performed by: INTERNAL MEDICINE

## 2018-09-19 PROCEDURE — 90686 IIV4 VACC NO PRSV 0.5 ML IM: CPT | Performed by: INTERNAL MEDICINE

## 2018-09-19 PROCEDURE — 99396 PREV VISIT EST AGE 40-64: CPT | Mod: 25 | Performed by: INTERNAL MEDICINE

## 2018-09-19 RX ORDER — BUPROPION HYDROCHLORIDE 150 MG/1
TABLET, EXTENDED RELEASE ORAL
Qty: 60 TABLET | Refills: 2 | Status: SHIPPED | OUTPATIENT
Start: 2018-09-19 | End: 2019-10-15

## 2018-09-19 ASSESSMENT — ENCOUNTER SYMPTOMS
PALPITATIONS: 0
DYSURIA: 0
HEMATURIA: 0
JOINT SWELLING: 1
COUGH: 0
CONSTIPATION: 0
NERVOUS/ANXIOUS: 0
EYE PAIN: 0
FEVER: 0
NAUSEA: 0
WEAKNESS: 0
SHORTNESS OF BREATH: 0
DIZZINESS: 0
DIARRHEA: 0
HEADACHES: 0
PARESTHESIAS: 0
CHILLS: 0
FREQUENCY: 1
SORE THROAT: 0
HEMATOCHEZIA: 0
MYALGIAS: 0
ABDOMINAL PAIN: 0

## 2018-09-19 NOTE — PROGRESS NOTES
SUBJECTIVE:   CC: Milton Gonzalez is an 52 year old male who presents for preventative health visit.     Physical   Annual:     Getting at least 3 servings of Calcium per day:  NO    Bi-annual eye exam:  Yes    Dental care twice a year:  NO    Sleep apnea or symptoms of sleep apnea:  None    Diet:  Regular (no restrictions)    Frequency of exercise:  1 day/week    Duration of exercise:  45-60 minutes    Taking medications regularly:  Yes    Medication side effects:  Not applicable    Additional concerns today:  YES    Urinary frequency, no nocturia.  Drinks 5-6 cups coffee daily    Today's PHQ-2 Score:   PHQ-2 ( 1999 Pfizer) 9/19/2018   Q1: Little interest or pleasure in doing things 1   Q2: Feeling down, depressed or hopeless 0   PHQ-2 Score 1   Q1: Little interest or pleasure in doing things Several days   Q2: Feeling down, depressed or hopeless Not at all   PHQ-2 Score 1       Abuse: Current or Past(Physical, Sexual or Emotional)- No  Do you feel safe in your environment - Yes    Social History   Substance Use Topics     Smoking status: Current Every Day Smoker     Packs/day: 1.00     Years: 24.00     Types: Cigarettes     Last attempt to quit: 5/30/2008     Smokeless tobacco: Never Used     Alcohol use No     Alcohol Use 9/19/2018   If you drink alcohol do you typically have greater than 3 drinks per day OR greater than 7 drinks per week? Not Applicable       Last PSA: No results found for: PSA    Reviewed orders with patient. Reviewed health maintenance and updated orders accordingly - Yes  Labs reviewed in EPIC    Reviewed and updated as needed this visit by clinical staff  Tobacco  Allergies  Meds         Reviewed and updated as needed this visit by Provider        Patient Active Problem List   Diagnosis     Tobacco use disorder     CARDIOVASCULAR SCREENING; LDL GOAL LESS THAN 160     Chronic low back pain     Past Medical History:   Diagnosis Date     Rotator cuff disorder 10/15/2008     Tobacco use  "disorder        Past Surgical History:   Procedure Laterality Date     ENT SURGERY      tonsils     NO HISTORY OF SURGERY         Family History   Problem Relation Age of Onset     Diabetes Father      Cancer - colorectal No family hx of      Hypertension Mother      Cerebrovascular Disease Maternal Grandfather      Cerebrovascular Disease Maternal Grandmother      Prostate Cancer No family hx of        ALLERGIES     Allergies   Allergen Reactions     Penicillins      As child         Review of Systems   Constitutional: Negative for chills and fever.   HENT: Negative for congestion, ear pain, hearing loss and sore throat.    Eyes: Negative for pain and visual disturbance.   Respiratory: Negative for cough and shortness of breath.    Cardiovascular: Negative for chest pain, palpitations and peripheral edema.   Gastrointestinal: Negative for abdominal pain, constipation, diarrhea, hematochezia and nausea.   Genitourinary: Positive for frequency and urgency. Negative for discharge, dysuria, genital sores, hematuria and impotence.   Musculoskeletal: Positive for joint swelling. Negative for myalgias.   Skin: Negative for rash.   Neurological: Negative for dizziness, weakness, headaches and paresthesias.   Psychiatric/Behavioral: Negative for mood changes. The patient is not nervous/anxious.        OBJECTIVE:   /78  Pulse 60  Temp 98.3  F (36.8  C) (Oral)  Ht 5' 10\" (1.778 m)  Wt 150 lb (68 kg)  BMI 21.52 kg/m2    Physical Exam  GENERAL: healthy, alert and no distress  EYES: Eyes grossly normal to inspection, PERRL and conjunctivae and sclerae normal  HENT: ear canals and TM's normal, nose and mouth without ulcers or lesions  NECK: no adenopathy, no asymmetry, masses, or scars and thyroid normal to palpation  RESP: lungs clear to auscultation - no rales, rhonchi or wheezes  CV: regular rate and rhythm, normal S1 S2, no S3 or S4, no murmur, click or rub, no peripheral edema and peripheral pulses " "strong  ABDOMEN: soft, nontender, no hepatosplenomegaly, no masses and bowel sounds normal  MS: no gross musculoskeletal defects noted, no edema  SKIN: no suspicious lesions or rashes  NEURO: Normal strength and tone, mentation intact and speech normal  PSYCH: mentation appears normal, affect normal/bright  Prostate: smooth, mildly enlarged    ASSESSMENT/PLAN:       ICD-10-CM    1. Encounter for routine adult health examination without abnormal findings Z00.00        2. Tobacco use disorder F17.200 buPROPion (WELLBUTRIN SR) 150 MG 12 hr tablet      Would like to quit.  Start zyban, set quit date Medication side effects discussed.      3. Need for prophylactic vaccination and inoculation against influenza Z23 FLU VACCINE, SPLIT VIRUS, IM (QUADRIVALENT) [66823]- >3 YRS     Vaccine Administration, Initial [66852]     4. Need for tetanus booster Z23 TDAP, IM (10 - 64 YRS) - Adacel     5. Screening for colon cancer Z12.11 GASTROENTEROLOGY ADULT REF PROCEDURE ONLY  Due for colonoscopy     6. Screening for prostate cancer Z12.5 PSA, screen     7. CARDIOVASCULAR SCREENING; LDL GOAL LESS THAN 160 Z13.6 Cholesterol     HDL cholesterol     Basic metabolic panel     Urinary frequency.  rec reduce fluid intake and eliminate caffeine over the next few weeks, follow-up if sx persist    COUNSELING:   Reviewed preventive health counseling, as reflected in patient instructions       Regular exercise       Healthy diet/nutrition       Colon cancer screening       Prostate cancer screening    BP Readings from Last 1 Encounters:   09/19/18 132/78     Estimated body mass index is 21.52 kg/(m^2) as calculated from the following:    Height as of this encounter: 5' 10\" (1.778 m).    Weight as of this encounter: 150 lb (68 kg).           reports that he has been smoking Cigarettes.  He has a 24.00 pack-year smoking history. He has never used smokeless tobacco.  Tobacco Cessation Action Plan: Pharmacotherapies : Zyban/Wellbutrin    Counseling " Resources:  ATP IV Guidelines  Pooled Cohorts Equation Calculator  FRAX Risk Assessment  ICSI Preventive Guidelines  Dietary Guidelines for Americans, 2010  [employer]'s MyPlate  ASA Prophylaxis  Lung CA Screening    Eldon Jimenez MD, MD  [facility] [place]    Injectable Influenza Immunization Documentation    1.  Is the person to be vaccinated sick today?   No    2. Does the person to be vaccinated have an allergy to a component   of the vaccine?   No  Egg Allergy Algorithm Link    3. Has the person to be vaccinated ever had a serious reaction   to influenza vaccine in the past?   No    4. Has the person to be vaccinated ever had Guillain-Barré syndrome?   No    Form completed by YUE Russell

## 2018-09-19 NOTE — PATIENT INSTRUCTIONS
INSTRUCTIONS FOR TODAY:     set your quit date, start bupropion (Zyban) 1 week prior   [facility] will call to schedule colonoscopy     Dr Jimenez    Preventive Health Recommendations  Male Ages 50 - 64    Yearly exam:             See your health care provider every year in order to  o   Review health changes.   o   Discuss preventive care.    o   Review your medicines if your doctor has prescribed any.     Have a cholesterol test every 5 years, or more frequently if you are at risk for high cholesterol/heart disease.     Have a diabetes test (fasting glucose) every three years. If you are at risk for diabetes, you should have this test more often.     Have a colonoscopy at age 50, or have a yearly FIT test (stool test). These exams will check for colon cancer.      Talk with your health care provider about whether or not a prostate cancer screening test (PSA) is right for you.    You should be tested each year for STDs (sexually transmitted diseases), if you re at risk.     Shots: Get a flu shot each year. Get a tetanus shot every 10 years.     Nutrition:    Eat at least 5 servings of fruits and vegetables daily.     Eat whole-grain bread, whole-wheat pasta and brown rice instead of white grains and rice.     Get adequate Calcium and Vitamin D.     Lifestyle    Exercise for at least 150 minutes a week (30 minutes a day, 5 days a week). This will help you control your weight and prevent disease.     Limit alcohol to one drink per day.     No smoking.     Wear sunscreen to prevent skin cancer.     See your dentist every six months for an exam and cleaning.     See your eye doctor every 1 to 2 years.

## 2018-09-19 NOTE — MR AVS SNAPSHOT
After Visit Summary   9/19/2018    Milton Gonzalez    MRN: [id number]           Patient Information     Date Of Birth          1965        Visit Information        Provider Department      9/19/2018 1:40 PM Eldon Jimenez MD [facility]        Today's Diagnoses     Encounter for routine adult health examination without abnormal findings    -  1    Need for prophylactic vaccination and inoculation against influenza        Tobacco use disorder        Need for tetanus booster        Screening for colon cancer        Screening for prostate cancer        CARDIOVASCULAR SCREENING; LDL GOAL LESS THAN 160          Care Instructions    INSTRUCTIONS FOR TODAY:     set your quit date, start bupropion (Zyban) 1 week prior   [facility] will call to schedule colonoscopy     Dr Jimenez    Preventive Health Recommendations  Male Ages 50 - 64    Yearly exam:             See your health care provider every year in order to  o   Review health changes.   o   Discuss preventive care.    o   Review your medicines if your doctor has prescribed any.     Have a cholesterol test every 5 years, or more frequently if you are at risk for high cholesterol/heart disease.     Have a diabetes test (fasting glucose) every three years. If you are at risk for diabetes, you should have this test more often.     Have a colonoscopy at age 50, or have a yearly FIT test (stool test). These exams will check for colon cancer.      Talk with your health care provider about whether or not a prostate cancer screening test (PSA) is right for you.    You should be tested each year for STDs (sexually transmitted diseases), if you re at risk.     Shots: Get a flu shot each year. Get a tetanus shot every 10 years.     Nutrition:    Eat at least 5 servings of fruits and vegetables daily.     Eat whole-grain bread, whole-wheat pasta and brown rice instead of white grains and rice.     Get adequate Calcium and Vitamin D.      Lifestyle    Exercise for at least 150 minutes a week (30 minutes a day, 5 days a week). This will help you control your weight and prevent disease.     Limit alcohol to one drink per day.     No smoking.     Wear sunscreen to prevent skin cancer.     See your dentist every six months for an exam and cleaning.     See your eye doctor every 1 to 2 years.            Follow-ups after your visit        Additional Services     GASTROENTEROLOGY ADULT REF PROCEDURE ONLY       Last Lab Result: Creatinine (mg/dL)       Date                     Value                 12/09/2014               0.99             ----------  Body mass index is 21.52 kg/(m^2).     Needed:  No  Language:  English    Patient will be contacted to schedule procedure.     Please be aware that coverage of these services is subject to the terms and limitations of your health insurance plan.  Call member services at your health plan with any benefit or coverage questions.  Any procedures must be performed at a [facility] facility OR coordinated by your clinic's referral office.    Please bring the following with you to your appointment:    (1) Any X-Rays, CTs or MRIs which have been performed.  Contact the facility where they were done to arrange for  prior to your scheduled appointment.    (2) List of current medications   (3) This referral request   (4) Any documents/labs given to you for this referral                  Who to contact     If you have questions or need follow up information about today's clinic visit or your schedule please contact [facility] [place] directly at [phone].  Normal or non-critical lab and imaging results will be communicated to you by MyChart, letter or phone within 4 business days after the clinic has received the results. If you do not hear from us within 7 days, please contact the clinic through MyChart or phone. If you have a critical or abnormal lab result, we will notify you by phone as soon  "as possible.  Submit refill requests through NX[employer] or call your pharmacy and they will forward the refill request to us. Please allow 3 business days for your refill to be completed.          Additional Information About Your Visit        [employer] Information     [employer] gives you secure access to your electronic health record. If you see a primary care provider, you can also send messages to your care team and make appointments. If you have questions, please call your primary care clinic.  If you do not have a primary care provider, please call [phone] and they will assist you.        Care EveryWhere ID     This is your Care EveryWhere ID. This could be used by other organizations to access your [facility] medical records  [id number]        Your Vitals Were     Pulse Temperature Height BMI (Body Mass Index)          60 98.3  F (36.8  C) (Oral) 5' 10\" (1.778 m) 21.52 kg/m2         Blood Pressure from Last 3 Encounters:   09/19/18 132/78   07/13/18 108/68   07/02/18 (!) 151/92    Weight from Last 3 Encounters:   09/19/18 150 lb (68 kg)   07/13/18 149 lb (67.6 kg)   09/01/17 156 lb (70.8 kg)              We Performed the Following     Basic metabolic panel     Cholesterol     FLU VACCINE, SPLIT VIRUS, IM (QUADRIVALENT) [17983]- >3 YRS     GASTROENTEROLOGY ADULT REF PROCEDURE ONLY     HDL cholesterol     PSA, screen     TDAP, IM (10 - 64 YRS) - Adacel     Vaccine Administration, Initial [57490]          Today's Medication Changes          These changes are accurate as of 9/19/18  2:04 PM.  If you have any questions, ask your nurse or doctor.               Start taking these medicines.        Dose/Directions    buPROPion 150 MG 12 hr tablet   Commonly known as:  WELLBUTRIN SR   Used for:  Tobacco use disorder   Started by:  Eldon Jimenez MD        Take 1 tablet once daily and increase to 1 tablet twice daily after 4 to 7 days   Quantity:  60 tablet   Refills:  2         Stop taking these medicines if you " haven't already. Please contact your care team if you have questions.     benzonatate 100 MG capsule   Commonly known as:  TESSALON   Stopped by:  Eldon Jimenez MD           erythromycin ophthalmic ointment   Commonly known as:  ROMYCIN   Stopped by:  Eldon Jimenez MD           guaiFENesin-codeine 100-10 MG/5ML Soln solution   Commonly known as:  ROBITUSSIN AC   Stopped by:  Eldon Jimenez MD                Where to get your medicines      These medications were sent to [id number] - [place] - [address]  [address]  [address] [place] [address]     Phone:  [phone]     buPROPion 150 MG 12 hr tablet                Primary Care Provider Office Phone # Fax #    Eldon Jimenez [phone] [address] [place] [address]        Equal Access to Services     [facility]: Hadii aad ku hadasho Soomaali, waaxda luqadaha, qaybta kaalmada adeegyada, waxay idiin hayaan adechikis kharash laLilaaan . So [facility] [phone].    ATENCIÓN: Si habla español, tiene a lorenzana disposición servicios gratuitos de asistencia lingüística. [facility] [phone].    We comply with applicable federal civil rights laws and [place] laws. We do not discriminate on the basis of race, color, national origin, age, disability, sex, sexual orientation, or gender identity.            Thank you!     Thank you for choosing [facility]  for your care. Our goal is always to provide you with excellent care. Hearing back from our patients is one way we can continue to improve our services. Please take a few minutes to complete the written survey that you may receive in the mail after your visit with us. Thank you!             Your Updated Medication List - Protect others around you: Learn how to safely use, store and throw away your medicines at www.disposemymeds.org.          This list is accurate as of 9/19/18  2:04 PM.  Always use  your most recent med list.                   Brand Name Dispense Instructions for use Diagnosis    ALEVE PO           buPROPion 150 MG 12 hr tablet    WELLBUTRIN SR    60 tablet    Take 1 tablet once daily and increase to 1 tablet twice daily after 4 to 7 days    Tobacco use disorder       NO ACTIVE MEDICATIONS

## 2018-09-20 LAB
ANION GAP SERPL CALCULATED.3IONS-SCNC: 10 MMOL/L (ref 3–14)
BUN SERPL-MCNC: 17 MG/DL (ref 7–30)
CALCIUM SERPL-MCNC: 8.9 MG/DL (ref 8.5–10.1)
CHLORIDE SERPL-SCNC: 105 MMOL/L (ref 94–109)
CHOLEST SERPL-MCNC: 185 MG/DL
CO2 SERPL-SCNC: 25 MMOL/L (ref 20–32)
CREAT SERPL-MCNC: 0.92 MG/DL (ref 0.66–1.25)
GFR SERPL CREATININE-BSD FRML MDRD: 87 ML/MIN/1.7M2
GLUCOSE SERPL-MCNC: 84 MG/DL (ref 70–99)
HDLC SERPL-MCNC: 40 MG/DL
POTASSIUM SERPL-SCNC: 4.2 MMOL/L (ref 3.4–5.3)
PSA SERPL-ACNC: 0.32 UG/L (ref 0–4)
SODIUM SERPL-SCNC: 140 MMOL/L (ref 133–144)

## 2018-12-03 ENCOUNTER — TRANSFERRED RECORDS (OUTPATIENT)
Dept: HEALTH INFORMATION MANAGEMENT | Facility: CLINIC | Age: 53
End: 2018-12-03

## 2019-09-20 ENCOUNTER — OFFICE VISIT (OUTPATIENT)
Dept: URGENT CARE | Facility: URGENT CARE | Age: 54
End: 2019-09-20
Payer: OTHER MISCELLANEOUS

## 2019-09-20 VITALS — SYSTOLIC BLOOD PRESSURE: 147 MMHG | HEART RATE: 64 BPM | DIASTOLIC BLOOD PRESSURE: 85 MMHG | TEMPERATURE: 97.5 F

## 2019-09-20 DIAGNOSIS — R03.0 ELEVATED BP WITHOUT DIAGNOSIS OF HYPERTENSION: ICD-10-CM

## 2019-09-20 DIAGNOSIS — S61.211A LACERATION OF LEFT INDEX FINGER WITHOUT FOREIGN BODY WITHOUT DAMAGE TO NAIL, INITIAL ENCOUNTER: Primary | ICD-10-CM

## 2019-09-20 PROCEDURE — 12001 RPR S/N/AX/GEN/TRNK 2.5CM/<: CPT | Performed by: FAMILY MEDICINE

## 2019-09-20 NOTE — PATIENT INSTRUCTIONS
Patient Education     Extremity Laceration: Stitches, Staples, or Tape  A laceration is a cut through the skin. If it is deep, it may require stitches or staples to close so it can heal. Minor cuts may be treated with surgical tape closures, or skin glue.  X-rays may be done if something may have entered the skin through the cut. You may also need a tetanus shot if you are not up to date on this vaccine.  Home care    Follow the healthcare provider s instructions on how to care for the cut.    Wash your hands with soap and warm water before and after caring for your wound. This is to help prevent infection.    Keep the wound clean and dry. If a bandage was applied and it becomes wet or dirty, replace it. Otherwise, leave it in place for the first 24 hours, then change it once a day or as directed.    If stitches or staples were used, clean the wound daily:  ? After removing the bandage, wash the area with soap and water. Use a wet cotton swab to loosen and remove any blood or crust that forms.  ? After cleaning, keep the wound clean and dry. Talk with your healthcare provider before putting any antibiotic ointment on the wound. Reapply the bandage.    You may remove the bandage to shower as usual after the first 24 hours, but don't soak the area in water (no swimming) until the stitches or staples are removed.    If surgical tape closures were used, keep the area clean and dry. If it becomes wet, blot it dry with a towel. Let the surgical tape fall off on its own.    The healthcare provider may prescribe an antibiotic cream or ointment to prevent infection. He or she may also prescribe an antibiotic pill. Don't stop taking this medicine until you have finished it all or the provider tells you to stop.    The provider may also prescribe medicine for pain. Follow the instructions for taking these medicines.    Don't do activities that may reopen your wound.  Follow-up care  Follow up with your healthcare provider,  or as advised. Most skin wounds heal within 10 days. But an infection may sometimes occur even with proper treatment. Check the wound daily for the signs of infection listed below. Stitches and staples should be removed within 7 to14 days. If surgical tape closures were used, you may remove them after 10 days if they have not fallen off by then.   When to seek medical advice  Call your healthcare provider right away if any of these occur:    Wound bleeding not controlled by direct pressure    Signs of infection, including increasing pain in the wound, increasing wound redness or swelling, or pus or bad odor coming from the wound    Fever of 100.4 F (38 C) or higher, or as directed by your healthcare provider    Stitches or staples come apart or fall out or surgical tape falls off before 7 days    Wound edges reopen    Wound changes colors    Numbness occurs around the wound     Decreased movement around the injured area  Date Last Reviewed: 7/1/2017 2000-2018 [employer]. [address] [place] [address]. All rights reserved. This information is not intended as a substitute for professional medical care. Always follow your healthcare professional's instructions.

## 2019-09-20 NOTE — PROGRESS NOTES
SUBJECTIVE:     Chief Complaint   Patient presents with     Urgent Care     left index finger laceration from a  blade during work this morning.      Milton Gonzalez is a 53 year old male who presents to the clinic with a laceration on the left finger sustained 1 hour(s) ago.  This is a work related injury.    Mechanism of injury: razor blade .    Associated symptoms: Denies numbness, weakness, or loss of function  Last tetanus booster within 10 years: yes    EXAM:   The patient appears today in alert,no apparent distress distress  VITALS: BP (!) 147/85   Pulse 64   Temp 97.5  F (36.4  C) (Oral)     Size of laceration: 1.5 centimeters noted at the tip of the left index finger on the palmar aspect at the distal phalanx   Characteristics of the laceration: flap type  Tendon function intact: no  Sensation to light touch intact: yes  Pulses intact: no  Picture included in patient's chart: no    Assessment:     Laceration of left index finger without foreign body without damage to nail, initial encounter  Elevated BP without diagnosis of hypertension      Elevated bp could be related to the pain     PLAN:  PROCEDURE NOTE::  Wound was locally injected with 1 cc's of Lidocaine 1% plain  Wound cleaned with HIBICLENS  Laceration was closed using 4 0 sutures interrupted sutures  After care instructions:  Keep wound clean and dry for the next 24-48 hours  Sutures out in 8 days  Signs of infection discussed today  Apply anti-bacterial ointment for 4 days  May return to work as long as wound is kept clean and dry    Mounika Carmona MD

## 2019-09-30 ENCOUNTER — OFFICE VISIT (OUTPATIENT)
Dept: URGENT CARE | Facility: URGENT CARE | Age: 54
End: 2019-09-30
Payer: OTHER MISCELLANEOUS

## 2019-09-30 VITALS
DIASTOLIC BLOOD PRESSURE: 62 MMHG | BODY MASS INDEX: 22.53 KG/M2 | OXYGEN SATURATION: 98 % | SYSTOLIC BLOOD PRESSURE: 126 MMHG | RESPIRATION RATE: 16 BRPM | HEART RATE: 68 BPM | WEIGHT: 157 LBS

## 2019-09-30 DIAGNOSIS — Z48.02 VISIT FOR SUTURE REMOVAL: ICD-10-CM

## 2019-09-30 PROCEDURE — 99207 ZZC NO CHARGE NURSE ONLY: CPT

## 2019-09-30 NOTE — PROGRESS NOTES
Suture removal:     Date sutures applied: 9/20/19         Where (setting) in which they applied:Urgent Care visit    Description:  Type: sutures  Location: L index finger    History:    Cause of laceration: cutter blade    Accompanying Signs & Symptoms: (staff: if yes-describe)  Redness: no  Warmth: no  Drainage: no  Still bleeding: no  Fevers: no    Last tetanus shot: last tetanus booster within 10 years

## 2019-10-15 ENCOUNTER — OFFICE VISIT (OUTPATIENT)
Dept: PEDIATRICS | Facility: CLINIC | Age: 54
End: 2019-10-15
Payer: COMMERCIAL

## 2019-10-15 VITALS
WEIGHT: 160 LBS | DIASTOLIC BLOOD PRESSURE: 70 MMHG | HEIGHT: 70 IN | HEART RATE: 71 BPM | SYSTOLIC BLOOD PRESSURE: 116 MMHG | OXYGEN SATURATION: 97 % | BODY MASS INDEX: 22.9 KG/M2 | TEMPERATURE: 98.9 F | RESPIRATION RATE: 16 BRPM

## 2019-10-15 DIAGNOSIS — Z00.00 ROUTINE GENERAL MEDICAL EXAMINATION AT A HEALTH CARE FACILITY: Primary | ICD-10-CM

## 2019-10-15 PROCEDURE — 99396 PREV VISIT EST AGE 40-64: CPT | Mod: 25 | Performed by: INTERNAL MEDICINE

## 2019-10-15 PROCEDURE — 90471 IMMUNIZATION ADMIN: CPT | Performed by: INTERNAL MEDICINE

## 2019-10-15 PROCEDURE — 90682 RIV4 VACC RECOMBINANT DNA IM: CPT | Performed by: INTERNAL MEDICINE

## 2019-10-15 ASSESSMENT — ANXIETY QUESTIONNAIRES
5. BEING SO RESTLESS THAT IT IS HARD TO SIT STILL: NOT AT ALL
7. FEELING AFRAID AS IF SOMETHING AWFUL MIGHT HAPPEN: NOT AT ALL
1. FEELING NERVOUS, ANXIOUS, OR ON EDGE: NOT AT ALL
IF YOU CHECKED OFF ANY PROBLEMS ON THIS QUESTIONNAIRE, HOW DIFFICULT HAVE THESE PROBLEMS MADE IT FOR YOU TO DO YOUR WORK, TAKE CARE OF THINGS AT HOME, OR GET ALONG WITH OTHER PEOPLE: NOT DIFFICULT AT ALL
3. WORRYING TOO MUCH ABOUT DIFFERENT THINGS: NOT AT ALL
6. BECOMING EASILY ANNOYED OR IRRITABLE: NOT AT ALL
GAD7 TOTAL SCORE: 1
2. NOT BEING ABLE TO STOP OR CONTROL WORRYING: NOT AT ALL

## 2019-10-15 ASSESSMENT — PATIENT HEALTH QUESTIONNAIRE - PHQ9
5. POOR APPETITE OR OVEREATING: SEVERAL DAYS
SUM OF ALL RESPONSES TO PHQ QUESTIONS 1-9: 4

## 2019-10-15 ASSESSMENT — MIFFLIN-ST. JEOR: SCORE: 1582.01

## 2019-10-15 NOTE — PROGRESS NOTES
SUBJECTIVE:   CC: Milton Gonzalez is an 54 year old male who presents for preventive health visit.     Healthy Habits:    Do you get at least three servings of calcium containing foods daily (dairy, green leafy vegetables, etc.)? yes    Amount of exercise or daily activities, outside of work: none    Problems taking medications regularly No    Medication side effects: No    Have you had an eye exam in the past two years? yes    Do you see a dentist twice per year? Has dentures    Do you have sleep apnea, excessive snoring or daytime drowsiness?no          Today's PHQ-2 Score:   PHQ-2 (  [employer]) 2018   Q1: Little interest or pleasure in doing things 1 0   Q2: Feeling down, depressed or hopeless 0 0   PHQ-2 Score 1 0   Q1: Little interest or pleasure in doing things Several days -   Q2: Feeling down, depressed or hopeless Not at all -   PHQ-2 Score 1 -       Abuse: Current or Past(Physical, Sexual or Emotional)- No  Do you feel safe in your environment? Yes    Social History     Tobacco Use     Smoking status: Former Smoker     Packs/day: 1.00     Years: 24.00     Pack years: 24.00     Types: Cigarettes     Last attempt to quit: 2018     Years since quittin.8     Smokeless tobacco: Never Used   Substance Use Topics     Alcohol use: No     If you drink alcohol do you typically have >3 drinks per day or >7 drinks per week? No                      Last PSA:   PSA   Date Value Ref Range Status   2018 0.32 0 - 4 ug/L Final     Comment:     Assay Method:  Chemiluminescence using Siemens Vista analyzer       Reviewed orders with patient. Reviewed health maintenance and updated orders accordingly - Yes      Reviewed and updated as needed this visit by clinical staff  Tobacco  Allergies  Meds         Reviewed and updated as needed this visit by Provider          Patient Active Problem List   Diagnosis     Tobacco use disorder     CARDIOVASCULAR SCREENING; LDL GOAL LESS THAN 160     Chronic  "low back pain     Visit for suture removal     Past Medical History:   Diagnosis Date     Rotator cuff disorder 10/15/2008     Tobacco use disorder        Past Surgical History:   Procedure Laterality Date     ENT SURGERY      tonsils     NO HISTORY OF SURGERY         Family History   Problem Relation Age of Onset     Hypertension Mother      Diabetes Father      Cerebrovascular Disease Maternal Grandfather      Cerebrovascular Disease Maternal Grandmother      Cancer - colorectal No family hx of      Prostate Cancer No family hx of        ALLERGIES     Allergies   Allergen Reactions     Penicillins      As child         ROS:  CONSTITUTIONAL: NEGATIVE for fever, chills, change in weight  INTEGUMENTARY/SKIN: NEGATIVE for worrisome rashes, moles or lesions  EYES: NEGATIVE for vision changes or irritation  ENT: NEGATIVE for ear, mouth and throat problems  RESP: NEGATIVE for significant cough or SOB  CV: NEGATIVE for chest pain, palpitations or peripheral edema  GI: NEGATIVE for nausea, abdominal pain, heartburn, or change in bowel habits   male: negative for dysuria, hematuria, decreased urinary stream, erectile dysfunction, urethral discharge  MUSCULOSKELETAL: NEGATIVE for significant arthralgias or myalgia  NEURO: NEGATIVE for weakness, dizziness or paresthesias  PSYCHIATRIC: NEGATIVE for changes in mood or affect    OBJECTIVE:   /70 (Cuff Size: Adult Regular)   Pulse 71   Temp 98.9  F (37.2  C) (Oral)   Resp 16   Ht 1.778 m (5' 10\")   Wt 72.6 kg (160 lb)   SpO2 97%   BMI 22.96 kg/m    EXAM:  GENERAL: healthy, alert and no distress  EYES: Eyes grossly normal to inspection, PERRL and conjunctivae and sclerae normal  HENT: ear canals and TM's normal, nose and mouth without ulcers or lesions  NECK: no adenopathy, no asymmetry, masses, or scars and thyroid normal to palpation  RESP: lungs clear to auscultation - no rales, rhonchi or wheezes  CV: regular rate and rhythm, normal S1 S2, no S3 or S4, no " "murmur, click or rub, no peripheral edema and peripheral pulses strong  ABDOMEN: soft, nontender, no hepatosplenomegaly, no masses and bowel sounds normal  MS: no gross musculoskeletal defects noted, no edema  SKIN: no suspicious lesions or rashes  NEURO: Normal strength and tone, mentation intact and speech normal  PSYCH: mentation appears normal, affect normal/bright    Recent Labs   Lab Test 09/19/18  1419 12/09/14  1453   CHOL 185 161   HDL 40 37*        Lab Results   Component Value Date    GLC 84 09/19/2018    GLC 79 12/09/2014     ASSESSMENT/PLAN:       ICD-10-CM    1. Routine general medical examination at a health care facility Z00.00 Quit smoking since most recent visit  Discussed diet/exercise weight mgmt and screening       COUNSELING:  Reviewed preventive health counseling, as reflected in patient instructions       Regular exercise       Healthy diet/nutrition       Colon cancer screening       Prostate cancer screening    Estimated body mass index is 22.96 kg/m  as calculated from the following:    Height as of this encounter: 1.778 m (5' 10\").    Weight as of this encounter: 72.6 kg (160 lb).         reports that he quit smoking about 10 months ago. His smoking use included cigarettes. He has a 24.00 pack-year smoking history. He has never used smokeless tobacco.      Counseling Resources:  ATP IV Guidelines  Pooled Cohorts Equation Calculator  FRAX Risk Assessment  ICSI Preventive Guidelines  Dietary Guidelines for Americans, 2010  USDA's MyPlate  ASA Prophylaxis  Lung CA Screening    Eldon Jimenez MD, MD  [facility] [place]  "

## 2019-10-16 ASSESSMENT — ANXIETY QUESTIONNAIRES: GAD7 TOTAL SCORE: 1

## 2020-11-16 ENCOUNTER — HEALTH MAINTENANCE LETTER (OUTPATIENT)
Age: 55
End: 2020-11-16

## 2021-05-31 ENCOUNTER — RECORDS - HEALTHEAST (OUTPATIENT)
Dept: ADMINISTRATIVE | Facility: CLINIC | Age: 56
End: 2021-05-31

## 2021-06-01 ENCOUNTER — RECORDS - HEALTHEAST (OUTPATIENT)
Dept: ADMINISTRATIVE | Facility: CLINIC | Age: 56
End: 2021-06-01

## 2021-06-07 ENCOUNTER — OFFICE VISIT (OUTPATIENT)
Dept: PEDIATRICS | Facility: CLINIC | Age: 56
End: 2021-06-07
Payer: COMMERCIAL

## 2021-06-07 VITALS
TEMPERATURE: 98.1 F | SYSTOLIC BLOOD PRESSURE: 122 MMHG | HEIGHT: 70 IN | DIASTOLIC BLOOD PRESSURE: 70 MMHG | BODY MASS INDEX: 21.15 KG/M2 | HEART RATE: 61 BPM | OXYGEN SATURATION: 100 % | WEIGHT: 147.7 LBS

## 2021-06-07 DIAGNOSIS — Z13.220 SCREENING FOR HYPERLIPIDEMIA: ICD-10-CM

## 2021-06-07 DIAGNOSIS — Z11.4 SCREENING FOR HIV (HUMAN IMMUNODEFICIENCY VIRUS): ICD-10-CM

## 2021-06-07 DIAGNOSIS — Z86.0100 HISTORY OF COLONIC POLYPS: ICD-10-CM

## 2021-06-07 DIAGNOSIS — Z11.59 NEED FOR HEPATITIS C SCREENING TEST: ICD-10-CM

## 2021-06-07 DIAGNOSIS — Z00.00 ROUTINE GENERAL MEDICAL EXAMINATION AT A HEALTH CARE FACILITY: Primary | ICD-10-CM

## 2021-06-07 DIAGNOSIS — L82.1 SEBORRHEIC KERATOSIS: ICD-10-CM

## 2021-06-07 DIAGNOSIS — R63.4 WEIGHT LOSS: ICD-10-CM

## 2021-06-07 PROBLEM — Z48.02 VISIT FOR SUTURE REMOVAL: Status: RESOLVED | Noted: 2019-09-30 | Resolved: 2021-06-07

## 2021-06-07 LAB
BASOPHILS # BLD AUTO: 0 10E9/L (ref 0–0.2)
BASOPHILS NFR BLD AUTO: 0.5 %
DIFFERENTIAL METHOD BLD: ABNORMAL
EOSINOPHIL # BLD AUTO: 0.2 10E9/L (ref 0–0.7)
EOSINOPHIL NFR BLD AUTO: 2.6 %
ERYTHROCYTE [DISTWIDTH] IN BLOOD BY AUTOMATED COUNT: 13.9 % (ref 10–15)
HCT VFR BLD AUTO: 39.9 % (ref 40–53)
HGB BLD-MCNC: 13.2 G/DL (ref 13.3–17.7)
LYMPHOCYTES # BLD AUTO: 1.3 10E9/L (ref 0.8–5.3)
LYMPHOCYTES NFR BLD AUTO: 19.6 %
MCH RBC QN AUTO: 32.1 PG (ref 26.5–33)
MCHC RBC AUTO-ENTMCNC: 33.1 G/DL (ref 31.5–36.5)
MCV RBC AUTO: 97 FL (ref 78–100)
MONOCYTES # BLD AUTO: 0.4 10E9/L (ref 0–1.3)
MONOCYTES NFR BLD AUTO: 6.6 %
NEUTROPHILS # BLD AUTO: 4.7 10E9/L (ref 1.6–8.3)
NEUTROPHILS NFR BLD AUTO: 70.7 %
PLATELET # BLD AUTO: 267 10E9/L (ref 150–450)
RBC # BLD AUTO: 4.11 10E12/L (ref 4.4–5.9)
WBC # BLD AUTO: 6.6 10E9/L (ref 4–11)

## 2021-06-07 PROCEDURE — 84439 ASSAY OF FREE THYROXINE: CPT | Performed by: INTERNAL MEDICINE

## 2021-06-07 PROCEDURE — 86803 HEPATITIS C AB TEST: CPT | Performed by: INTERNAL MEDICINE

## 2021-06-07 PROCEDURE — 36415 COLL VENOUS BLD VENIPUNCTURE: CPT | Performed by: INTERNAL MEDICINE

## 2021-06-07 PROCEDURE — 87389 HIV-1 AG W/HIV-1&-2 AB AG IA: CPT | Performed by: INTERNAL MEDICINE

## 2021-06-07 PROCEDURE — 99213 OFFICE O/P EST LOW 20 MIN: CPT | Mod: 25 | Performed by: INTERNAL MEDICINE

## 2021-06-07 PROCEDURE — 99396 PREV VISIT EST AGE 40-64: CPT | Performed by: INTERNAL MEDICINE

## 2021-06-07 PROCEDURE — 80050 GENERAL HEALTH PANEL: CPT | Performed by: INTERNAL MEDICINE

## 2021-06-07 PROCEDURE — 80061 LIPID PANEL: CPT | Performed by: INTERNAL MEDICINE

## 2021-06-07 ASSESSMENT — ENCOUNTER SYMPTOMS
ABDOMINAL PAIN: 0
DYSURIA: 0
NAUSEA: 0
FEVER: 0
HEARTBURN: 1
PARESTHESIAS: 0
FREQUENCY: 1
DIZZINESS: 0
MYALGIAS: 0
WEAKNESS: 0
DIARRHEA: 0
ARTHRALGIAS: 0
PALPITATIONS: 0
SORE THROAT: 0
HEMATOCHEZIA: 0
CHILLS: 0
HEMATURIA: 0
JOINT SWELLING: 0
HEADACHES: 0
NERVOUS/ANXIOUS: 0
COUGH: 0
SHORTNESS OF BREATH: 0
CONSTIPATION: 0
EYE PAIN: 0

## 2021-06-07 ASSESSMENT — MIFFLIN-ST. JEOR: SCORE: 1521.21

## 2021-06-07 NOTE — PROGRESS NOTES
SUBJECTIVE:   CC: Milton Gonzalez is an 55 year old male who presents for preventative health visit.       Patient has been advised of split billing requirements and indicates understanding: Yes  Healthy Habits:     Getting at least 3 servings of Calcium per day:  NO    Bi-annual eye exam:  Yes    Dental care twice a year:  NO    Sleep apnea or symptoms of sleep apnea:  None    Diet:  Regular (no restrictions)    Frequency of exercise:  2-3 days/week    Duration of exercise:  30-45 minutes    Taking medications regularly:  Yes    Medication side effects:  None    PHQ-2 Total Score: 0    Additional concerns today:  No    Pt is additionally concerned with moles on R side of forehead and top of head. Derm concern. Moles have been there 10x years.      Today's PHQ-2 Score:   PHQ-2 (  [employer]) 2021   Q1: Little interest or pleasure in doing things 0   Q2: Feeling down, depressed or hopeless 0   PHQ-2 Score 0   Q1: Little interest or pleasure in doing things Not at all   Q2: Feeling down, depressed or hopeless Not at all   PHQ-2 Score 0       Abuse: Current or Past(Physical, Sexual or Emotional)- No  Do you feel safe in your environment? Yes      Social History     Tobacco Use     Smoking status: Former Smoker     Packs/day: 1.00     Years: 24.00     Pack years: 24.00     Types: Cigarettes     Quit date: 2018     Years since quittin.5     Smokeless tobacco: Never Used   Substance Use Topics     Alcohol use: No     If you drink alcohol do you typically have >3 drinks per day or >7 drinks per week? No    Alcohol Use 2021   Prescreen: >3 drinks/day or >7 drinks/week? No   Prescreen: >3 drinks/day or >7 drinks/week? -       Last PSA:   PSA   Date Value Ref Range Status   2018 0.32 0 - 4 ug/L Final     Comment:     Assay Method:  Chemiluminescence using Siemens Vista analyzer       Reviewed orders with patient. Reviewed health maintenance and updated orders accordingly - Yes  Patient Active Problem List    Diagnosis     Tobacco use disorder     CARDIOVASCULAR SCREENING; LDL GOAL LESS THAN 160     Chronic low back pain     Past Surgical History:   Procedure Laterality Date     ENT SURGERY      tonsils     NO HISTORY OF SURGERY         Social History     Tobacco Use     Smoking status: Former Smoker     Packs/day: 1.00     Years: 24.00     Pack years: 24.00     Types: Cigarettes     Quit date: 2018     Years since quittin.5     Smokeless tobacco: Never Used   Substance Use Topics     Alcohol use: No     Family History   Problem Relation Age of Onset     Hypertension Mother      Diabetes Father      Cerebrovascular Disease Maternal Grandfather      Cerebrovascular Disease Maternal Grandmother      Cancer - colorectal No family hx of      Prostate Cancer No family hx of          Current Outpatient Medications   Medication Sig Dispense Refill     Naproxen Sodium (ALEVE PO)        NO ACTIVE MEDICATIONS   0       Reviewed and updated as needed this visit by clinical staff  Tobacco  Allergies  Meds   Med Hx  Surg Hx  Fam Hx  Soc Hx        Reviewed and updated as needed this visit by Provider                    Review of Systems   Constitutional: Negative for chills and fever.   HENT: Negative for congestion, ear pain, hearing loss and sore throat.    Eyes: Negative for pain and visual disturbance.   Respiratory: Negative for cough and shortness of breath.    Cardiovascular: Negative for chest pain, palpitations and peripheral edema.   Gastrointestinal: Positive for heartburn. Negative for abdominal pain, constipation, diarrhea, hematochezia and nausea.   Genitourinary: Positive for frequency. Negative for discharge, dysuria, genital sores, hematuria, impotence and urgency.   Musculoskeletal: Negative for arthralgias, joint swelling and myalgias.   Skin: Negative for rash.   Neurological: Negative for dizziness, weakness, headaches and paresthesias.   Psychiatric/Behavioral: Negative for mood changes. The  "patient is not nervous/anxious.        OBJECTIVE:   /70 (BP Location: Right arm, Patient Position: Sitting, Cuff Size: Adult Regular)   Pulse 61   Temp 98.1  F (36.7  C) (Tympanic)   Ht 1.778 m (5' 10\")   Wt 67 kg (147 lb 11.2 oz)   SpO2 100%   BMI 21.19 kg/m      Physical Exam  GENERAL: healthy, alert and no distress  EYES: Eyes grossly normal to inspection, PERRL and conjunctivae and sclerae normal  HENT: ear canals and TM's normal, nose and mouth without ulcers or lesions  NECK: no adenopathy, no asymmetry, masses, or scars and thyroid normal to palpation  RESP: lungs clear to auscultation - no rales, rhonchi or wheezes  CV: regular rate and rhythm, normal S1 S2, no S3 or S4, no murmur, click or rub, no peripheral edema and peripheral pulses strong  ABDOMEN: soft, nontender, no hepatosplenomegaly, no masses and bowel sounds normal  MS: no gross musculoskeletal defects noted, no edema  SKIN: no suspicious lesions or rashes, 2 brown exophytic lesions on scalp roughly 2cm each, well demarcated  NEURO: Normal strength and tone, mentation intact and speech normal  PSYCH: mentation appears normal, affect normal/bright    ASSESSMENT/PLAN:   (Z00.00) Routine general medical examination at a health care facility  (primary encounter diagnosis)    (L82.1) Seborrheic keratosis  Comment: dx discussed, requests excision/referral to Dermatology  Plan: ADULT DERMATOLOGY REFERRAL          (R63.4) Weight loss  Comment: weight loss, 13lbs since 2019.  States he has been exercising regularly since he quit smoking, changed diet.  Additional labwork today:  Plan: CBC with platelets differential, TSH with free         T4 reflex          (Z13.220) Screening for hyperlipidemia  Comment:   Plan: Lipid panel reflex to direct LDL Fasting,         Comprehensive metabolic panel          (Z11.4) Screening for HIV (human immunodeficiency virus)  Comment:   Plan: HIV Antigen Antibody Combo          (Z11.59) Need for hepatitis C " "screening test  Comment:   Plan: Hepatitis C Screen Reflex to HCV RNA Quant and         Genotype          (Z86.010) History of colonic polyps  Comment: due for colonoscopy  Plan: GASTROENTEROLOGY ADULT REF PROCEDURE ONLY            Patient has been advised of split billing requirements and indicates understanding: Yes  COUNSELING:   Reviewed preventive health counseling, as reflected in patient instructions       Regular exercise       Healthy diet/nutrition       Colon cancer screening    Estimated body mass index is 21.19 kg/m  as calculated from the following:    Height as of this encounter: 1.778 m (5' 10\").    Weight as of this encounter: 67 kg (147 lb 11.2 oz).         He reports that he quit smoking about 2 years ago. His smoking use included cigarettes. He has a 24.00 pack-year smoking history. He has never used smokeless tobacco.      Counseling Resources:  ATP IV Guidelines  Pooled Cohorts Equation Calculator  FRAX Risk Assessment  ICSI Preventive Guidelines  Dietary Guidelines for Americans, 2010  USDA's MyPlate  ASA Prophylaxis  Lung CA Screening    Eldon Jimenez MD  [facility] [place]  "

## 2021-06-08 ENCOUNTER — MYC MEDICAL ADVICE (OUTPATIENT)
Dept: PEDIATRICS | Facility: CLINIC | Age: 56
End: 2021-06-08

## 2021-06-08 LAB
ALBUMIN SERPL-MCNC: 4.1 G/DL (ref 3.4–5)
ALP SERPL-CCNC: 79 U/L (ref 40–150)
ALT SERPL W P-5'-P-CCNC: 20 U/L (ref 0–70)
ANION GAP SERPL CALCULATED.3IONS-SCNC: 4 MMOL/L (ref 3–14)
AST SERPL W P-5'-P-CCNC: 24 U/L (ref 0–45)
BILIRUB SERPL-MCNC: 0.6 MG/DL (ref 0.2–1.3)
BUN SERPL-MCNC: 20 MG/DL (ref 7–30)
CALCIUM SERPL-MCNC: 8.7 MG/DL (ref 8.5–10.1)
CHLORIDE SERPL-SCNC: 109 MMOL/L (ref 94–109)
CHOLEST SERPL-MCNC: 174 MG/DL
CO2 SERPL-SCNC: 27 MMOL/L (ref 20–32)
CREAT SERPL-MCNC: 1.04 MG/DL (ref 0.66–1.25)
GFR SERPL CREATININE-BSD FRML MDRD: 81 ML/MIN/{1.73_M2}
GLUCOSE SERPL-MCNC: 70 MG/DL (ref 70–99)
HCV AB SERPL QL IA: NONREACTIVE
HDLC SERPL-MCNC: 45 MG/DL
HIV 1+2 AB+HIV1 P24 AG SERPL QL IA: NONREACTIVE
LDLC SERPL CALC-MCNC: 105 MG/DL
NONHDLC SERPL-MCNC: 129 MG/DL
POTASSIUM SERPL-SCNC: 4.2 MMOL/L (ref 3.4–5.3)
PROT SERPL-MCNC: 6.8 G/DL (ref 6.8–8.8)
SODIUM SERPL-SCNC: 140 MMOL/L (ref 133–144)
T4 FREE SERPL-MCNC: 0.75 NG/DL (ref 0.76–1.46)
TRIGL SERPL-MCNC: 123 MG/DL
TSH SERPL DL<=0.005 MIU/L-ACNC: 14.2 MU/L (ref 0.4–4)

## 2021-06-09 DIAGNOSIS — Z11.59 ENCOUNTER FOR SCREENING FOR OTHER VIRAL DISEASES: ICD-10-CM

## 2021-06-20 ENCOUNTER — TELEPHONE (OUTPATIENT)
Dept: PEDIATRICS | Facility: CLINIC | Age: 56
End: 2021-06-20

## 2021-06-20 DIAGNOSIS — R94.6 THYROID FUNCTION TEST ABNORMAL: Primary | ICD-10-CM

## 2021-06-20 NOTE — TELEPHONE ENCOUNTER
Please call pt:  Recent results:  Thyroid labs slightly abnormal.    Lab Results   Component Value Date    TSH 14.20 06/07/2021     Lab Results   Component Value Date    T4 0.75 06/07/2021     Please have patient return for follow-up lab work in the next few weeks.

## 2021-06-21 NOTE — TELEPHONE ENCOUNTER
Called and informed patient of Dr. Jimenez's message below. Scheduled patient for a lab only appointment on 7/7/21. Yasmine Maradiaga RN on 6/21/2021 at 9:03 AM

## 2021-07-02 DIAGNOSIS — Z11.59 ENCOUNTER FOR SCREENING FOR OTHER VIRAL DISEASES: ICD-10-CM

## 2021-07-02 LAB
LABORATORY COMMENT REPORT: NORMAL
SARS-COV-2 RNA RESP QL NAA+PROBE: NEGATIVE
SARS-COV-2 RNA RESP QL NAA+PROBE: NORMAL
SPECIMEN SOURCE: NORMAL
SPECIMEN SOURCE: NORMAL

## 2021-07-02 PROCEDURE — U0003 INFECTIOUS AGENT DETECTION BY NUCLEIC ACID (DNA OR RNA); SEVERE ACUTE RESPIRATORY SYNDROME CORONAVIRUS 2 (SARS-COV-2) (CORONAVIRUS DISEASE [COVID-19]), AMPLIFIED PROBE TECHNIQUE, MAKING USE OF HIGH THROUGHPUT TECHNOLOGIES AS DESCRIBED BY CMS-2020-01-R: HCPCS | Performed by: COLON & RECTAL SURGERY

## 2021-07-02 PROCEDURE — U0005 INFEC AGEN DETEC AMPLI PROBE: HCPCS | Performed by: COLON & RECTAL SURGERY

## 2021-07-06 ENCOUNTER — HOSPITAL ENCOUNTER (OUTPATIENT)
Facility: CLINIC | Age: 56
Discharge: HOME | End: 2021-07-06
Attending: COLON & RECTAL SURGERY | Admitting: COLON & RECTAL SURGERY
Payer: COMMERCIAL

## 2021-07-06 VITALS
TEMPERATURE: 97.6 F | SYSTOLIC BLOOD PRESSURE: 109 MMHG | OXYGEN SATURATION: 97 % | DIASTOLIC BLOOD PRESSURE: 78 MMHG | RESPIRATION RATE: 14 BRPM | HEART RATE: 55 BPM

## 2021-07-06 LAB — COLONOSCOPY: NORMAL

## 2021-07-06 PROCEDURE — 250N000013 HC RX MED GY IP 250 OP 250 PS 637: Performed by: COLON & RECTAL SURGERY

## 2021-07-06 PROCEDURE — 45378 DIAGNOSTIC COLONOSCOPY: CPT | Performed by: COLON & RECTAL SURGERY

## 2021-07-06 PROCEDURE — G0500 MOD SEDAT ENDO SERVICE >5YRS: HCPCS | Performed by: COLON & RECTAL SURGERY

## 2021-07-06 PROCEDURE — 250N000011 HC RX IP 250 OP 636: Performed by: COLON & RECTAL SURGERY

## 2021-07-06 PROCEDURE — G0105 COLORECTAL SCRN; HI RISK IND: HCPCS | Performed by: COLON & RECTAL SURGERY

## 2021-07-06 RX ORDER — ONDANSETRON 2 MG/ML
4 INJECTION INTRAMUSCULAR; INTRAVENOUS
Status: DISCONTINUED | OUTPATIENT
Start: 2021-07-06 | End: 2021-07-06 | Stop reason: HOSPADM

## 2021-07-06 RX ORDER — DIPHENHYDRAMINE HCL 25 MG
25 CAPSULE ORAL EVERY 4 HOURS PRN
Status: DISCONTINUED | OUTPATIENT
Start: 2021-07-06 | End: 2021-07-06 | Stop reason: HOSPADM

## 2021-07-06 RX ORDER — NALOXONE HYDROCHLORIDE 0.4 MG/ML
0.4 INJECTION, SOLUTION INTRAMUSCULAR; INTRAVENOUS; SUBCUTANEOUS
Status: DISCONTINUED | OUTPATIENT
Start: 2021-07-06 | End: 2021-07-06 | Stop reason: HOSPADM

## 2021-07-06 RX ORDER — SIMETHICONE 40MG/0.6ML
SUSPENSION, DROPS(FINAL DOSAGE FORM)(ML) ORAL PRN
Status: COMPLETED | OUTPATIENT
Start: 2021-07-06 | End: 2021-07-06

## 2021-07-06 RX ORDER — LIDOCAINE 40 MG/G
CREAM TOPICAL
Status: DISCONTINUED | OUTPATIENT
Start: 2021-07-06 | End: 2021-07-06 | Stop reason: HOSPADM

## 2021-07-06 RX ORDER — ONDANSETRON 2 MG/ML
4 INJECTION INTRAMUSCULAR; INTRAVENOUS EVERY 6 HOURS PRN
Status: DISCONTINUED | OUTPATIENT
Start: 2021-07-06 | End: 2021-07-06 | Stop reason: HOSPADM

## 2021-07-06 RX ORDER — NALOXONE HYDROCHLORIDE 0.4 MG/ML
0.2 INJECTION, SOLUTION INTRAMUSCULAR; INTRAVENOUS; SUBCUTANEOUS
Status: DISCONTINUED | OUTPATIENT
Start: 2021-07-06 | End: 2021-07-06 | Stop reason: HOSPADM

## 2021-07-06 RX ORDER — PROCHLORPERAZINE MALEATE 10 MG
10 TABLET ORAL EVERY 6 HOURS PRN
Status: DISCONTINUED | OUTPATIENT
Start: 2021-07-06 | End: 2021-07-06 | Stop reason: HOSPADM

## 2021-07-06 RX ORDER — DIPHENHYDRAMINE HYDROCHLORIDE 50 MG/ML
25 INJECTION INTRAMUSCULAR; INTRAVENOUS EVERY 4 HOURS PRN
Status: DISCONTINUED | OUTPATIENT
Start: 2021-07-06 | End: 2021-07-06 | Stop reason: HOSPADM

## 2021-07-06 RX ORDER — FENTANYL CITRATE 50 UG/ML
INJECTION, SOLUTION INTRAMUSCULAR; INTRAVENOUS PRN
Status: COMPLETED | OUTPATIENT
Start: 2021-07-06 | End: 2021-07-06

## 2021-07-06 RX ORDER — ONDANSETRON 4 MG/1
4 TABLET, ORALLY DISINTEGRATING ORAL EVERY 6 HOURS PRN
Status: DISCONTINUED | OUTPATIENT
Start: 2021-07-06 | End: 2021-07-06 | Stop reason: HOSPADM

## 2021-07-06 RX ORDER — FLUMAZENIL 0.1 MG/ML
0.2 INJECTION, SOLUTION INTRAVENOUS
Status: DISCONTINUED | OUTPATIENT
Start: 2021-07-06 | End: 2021-07-06 | Stop reason: HOSPADM

## 2021-07-06 RX ADMIN — FENTANYL CITRATE 100 MCG: 50 INJECTION, SOLUTION INTRAMUSCULAR; INTRAVENOUS at 07:26

## 2021-07-06 RX ADMIN — MIDAZOLAM 2 MG: 1 INJECTION INTRAMUSCULAR; INTRAVENOUS at 07:26

## 2021-07-06 RX ADMIN — Medication 133 MG: at 07:31

## 2021-07-06 NOTE — H&P
Pre-Endoscopy History and Physical     Milton Gonzalez MRN# [id number]   YOB: 1965 Age: 55 year old     Date of Procedure: 2021  Primary care provider: Eldon Jimenez  Type of Endoscopy: Colonoscopy  Reason for Procedure: H/O polyps  Type of Anesthesia Anticipated: Moderate Sedation    HPI:    Milton is a 55 year old male who will be undergoing the above procedure.      A history and physical has been performed. The patient's medications and allergies have been reviewed. The risks and benefits of the procedure and the sedation options and risks were discussed with the patient.  All questions were answered and informed consent was obtained.      He denies a personal or family history of anesthesia complications or bleeding disorders.     Allergies   Allergen Reactions     Pcn [Penicillins]      As child        No current facility-administered medications on file prior to encounter.   aspirin (ASA) 325 MG EC tablet, Take 325 mg by mouth daily  Naproxen Sodium (ALEVE PO),         Patient Active Problem List   Diagnosis     Tobacco use disorder     CARDIOVASCULAR SCREENING; LDL GOAL LESS THAN 160     Chronic low back pain        Past Medical History:   Diagnosis Date     Rotator cuff disorder 10/15/2008     Tobacco use disorder         Past Surgical History:   Procedure Laterality Date     COLONOSCOPY       ENT SURGERY      tonsils     NO HISTORY OF SURGERY         Social History     Tobacco Use     Smoking status: Former Smoker     Packs/day: 1.00     Years: 24.00     Pack years: 24.00     Types: Cigarettes     Quit date: 2018     Years since quittin.5     Smokeless tobacco: Never Used   Substance Use Topics     Alcohol use: No       Family History   Problem Relation Age of Onset     Hypertension Mother      Diabetes Father      Cerebrovascular Disease Maternal Grandfather      Cerebrovascular Disease Maternal Grandmother      Cancer - colorectal No family hx of      Prostate Cancer No  "family hx of      Colon Cancer No family hx of        REVIEW OF SYSTEMS:     5 point ROS negative except as noted above in HPI, including Gen., Resp., CV, GI &  system review.      PHYSICAL EXAM:   There were no vitals taken for this visit. Estimated body mass index is 21.19 kg/m  as calculated from the following:    Height as of 6/7/21: 1.778 m (5' 10\").    Weight as of 6/7/21: 67 kg (147 lb 11.2 oz).   GENERAL APPEARANCE: healthy and alert  MENTAL STATUS: alert  AIRWAY EXAM: Mallampatti Class I (visualization of the soft palate, fauces, uvula, anterior and posterior pillars)  RESP: lungs clear to auscultation - no rales, rhonchi or wheezes  CV: regular rates and rhythm      IMPRESSION   ASA Class 2 - Mild systemic disease        PLAN:     Plan for colonoscopy. We discussed the risks, benefits and alternatives and the patient wished to proceed.    The above has been forwarded to the consulting provider.      Janice Boateng MD  [facility]  Phone: [phone]  Fax: [phone]  July 6, 2021    "

## 2021-07-07 DIAGNOSIS — R94.6 THYROID FUNCTION TEST ABNORMAL: ICD-10-CM

## 2021-07-07 PROCEDURE — 36415 COLL VENOUS BLD VENIPUNCTURE: CPT | Performed by: INTERNAL MEDICINE

## 2021-07-07 PROCEDURE — 84443 ASSAY THYROID STIM HORMONE: CPT | Performed by: INTERNAL MEDICINE

## 2021-07-07 PROCEDURE — 86376 MICROSOMAL ANTIBODY EACH: CPT | Performed by: INTERNAL MEDICINE

## 2021-07-07 PROCEDURE — 84439 ASSAY OF FREE THYROXINE: CPT | Performed by: INTERNAL MEDICINE

## 2021-07-07 PROCEDURE — 86800 THYROGLOBULIN ANTIBODY: CPT | Performed by: INTERNAL MEDICINE

## 2021-07-08 LAB
T4 FREE SERPL-MCNC: 0.76 NG/DL (ref 0.76–1.46)
THYROGLOB AB SERPL IA-ACNC: <20 IU/ML (ref 0–40)
THYROPEROXIDASE AB SERPL-ACNC: 891 IU/ML
TSH SERPL DL<=0.005 MIU/L-ACNC: 13.54 MU/L (ref 0.4–4)

## 2021-07-14 ENCOUNTER — TELEPHONE (OUTPATIENT)
Dept: PEDIATRICS | Facility: CLINIC | Age: 56
End: 2021-07-14

## 2021-07-14 DIAGNOSIS — E06.3 HYPOTHYROIDISM DUE TO HASHIMOTO'S THYROIDITIS: Primary | ICD-10-CM

## 2021-07-14 NOTE — TELEPHONE ENCOUNTER
recent labwork reviewed.       Component      Latest Ref Rng & Units 7/7/2021   Thyroglobulin Antibody      <40 IU/mL <20   Thyroid Peroxidase Antibody      <35 IU/mL 891 (H)   TSH      0.40 - 4.00 mU/L 13.54 (H)   T4 Free      0.76 - 1.46 ng/dL 0.76

## 2021-07-15 PROBLEM — E06.3 HYPOTHYROIDISM DUE TO HASHIMOTO'S THYROIDITIS: Status: ACTIVE | Noted: 2021-07-15

## 2021-07-15 PROBLEM — E06.3 HASHIMOTO'S THYROIDITIS: Status: RESOLVED | Noted: 2021-07-15 | Resolved: 2021-07-15

## 2021-07-15 PROBLEM — E06.3 HASHIMOTO'S THYROIDITIS: Status: ACTIVE | Noted: 2021-07-15

## 2021-07-15 RX ORDER — LEVOTHYROXINE SODIUM 50 UG/1
50 TABLET ORAL DAILY
Qty: 90 TABLET | Refills: 1 | Status: SHIPPED | OUTPATIENT
Start: 2021-07-15 | End: 2021-09-27

## 2021-07-16 NOTE — TELEPHONE ENCOUNTER
Called patient to discuss lab results and medication change, no answer. M requesting a call back directly to [facility] extension.     Josie Varela, [facility]  [phone]  8:41 AM, July 16, 2021

## 2021-07-16 NOTE — TELEPHONE ENCOUNTER
Please call patient.  Based on his recent thyroid lab work it appears that his thyroid is underactive.  This may account for ongoing symptoms of fatigue.  I am sending a prescription for supplemental thyroid hormone which is indicated.  Please have him return for a repeat nonfasting lab draw to recheck his thyroid after about 2 months on the new medication.

## 2021-07-16 NOTE — TELEPHONE ENCOUNTER
Patient returned call and was informed of new medication, scheduled 2 month lab only follow-up.     Josie Varela [facility]  [phone]  11:00 AM, July 16, 2021

## 2021-08-05 ENCOUNTER — MYC MEDICAL ADVICE (OUTPATIENT)
Dept: PEDIATRICS | Facility: CLINIC | Age: 56
End: 2021-08-05

## 2021-09-18 ENCOUNTER — HEALTH MAINTENANCE LETTER (OUTPATIENT)
Age: 56
End: 2021-09-18

## 2021-09-20 ENCOUNTER — LAB (OUTPATIENT)
Dept: LAB | Facility: CLINIC | Age: 56
End: 2021-09-20
Payer: COMMERCIAL

## 2021-09-20 DIAGNOSIS — E06.3 HYPOTHYROIDISM DUE TO HASHIMOTO'S THYROIDITIS: ICD-10-CM

## 2021-09-20 PROCEDURE — 84439 ASSAY OF FREE THYROXINE: CPT

## 2021-09-20 PROCEDURE — 84443 ASSAY THYROID STIM HORMONE: CPT

## 2021-09-20 PROCEDURE — 36415 COLL VENOUS BLD VENIPUNCTURE: CPT

## 2021-09-21 LAB
T4 FREE SERPL-MCNC: 0.81 NG/DL (ref 0.76–1.46)
TSH SERPL DL<=0.005 MIU/L-ACNC: 9.16 MU/L (ref 0.4–4)

## 2021-09-27 ENCOUNTER — TELEPHONE (OUTPATIENT)
Dept: PEDIATRICS | Facility: CLINIC | Age: 56
End: 2021-09-27

## 2021-09-27 DIAGNOSIS — E06.3 HYPOTHYROIDISM DUE TO HASHIMOTO'S THYROIDITIS: Primary | ICD-10-CM

## 2021-09-27 RX ORDER — LEVOTHYROXINE SODIUM 100 UG/1
100 TABLET ORAL DAILY
Qty: 90 TABLET | Refills: 0 | Status: SHIPPED | OUTPATIENT
Start: 2021-09-27 | End: 2021-12-06

## 2021-09-27 NOTE — TELEPHONE ENCOUNTER
Please call pt:  Recent results:    Lab Results   Component Value Date    TSH 9.16 09/20/2021    TSH 13.54 07/07/2021     Lab Results   Component Value Date    T4 0.81 09/20/2021    T4 0.76 07/07/2021     Current levothyroxine dose: 50 mcg daily    Dosing needs adjustment.  Please instruct pt to start new dose:   levothyroxine 100 mcg daily  Pt should return for repeat labwork in 2months--please schedule.  The prescription has been sent.

## 2021-09-28 NOTE — TELEPHONE ENCOUNTER
Called and informed patient of Dr. Jimenez's message below. Patient will take levothyroxine 100mcg daily. Scheduled patient for a lab only appointment on 11/30. Yasmine Maradiaga RN on 9/28/2021 at 8:47 AM

## 2021-11-30 ENCOUNTER — LAB (OUTPATIENT)
Dept: LAB | Facility: CLINIC | Age: 56
End: 2021-11-30
Payer: COMMERCIAL

## 2021-11-30 DIAGNOSIS — E06.3 HYPOTHYROIDISM DUE TO HASHIMOTO'S THYROIDITIS: ICD-10-CM

## 2021-11-30 PROCEDURE — 84443 ASSAY THYROID STIM HORMONE: CPT

## 2021-11-30 PROCEDURE — 84439 ASSAY OF FREE THYROXINE: CPT

## 2021-11-30 PROCEDURE — 36415 COLL VENOUS BLD VENIPUNCTURE: CPT

## 2021-12-01 LAB
T4 FREE SERPL-MCNC: 0.83 NG/DL (ref 0.76–1.46)
TSH SERPL DL<=0.005 MIU/L-ACNC: 11.18 MU/L (ref 0.4–4)

## 2021-12-06 ENCOUNTER — TELEPHONE (OUTPATIENT)
Dept: PEDIATRICS | Facility: CLINIC | Age: 56
End: 2021-12-06
Payer: COMMERCIAL

## 2021-12-06 DIAGNOSIS — E06.3 HYPOTHYROIDISM DUE TO HASHIMOTO'S THYROIDITIS: Primary | ICD-10-CM

## 2021-12-06 RX ORDER — LEVOTHYROXINE SODIUM 125 UG/1
125 TABLET ORAL DAILY
Qty: 90 TABLET | Refills: 0 | Status: SHIPPED | OUTPATIENT
Start: 2021-12-06 | End: 2022-03-10

## 2021-12-06 NOTE — TELEPHONE ENCOUNTER
Called pt and notified of results and instructions. He verbalized understanding. Will call as needed.Brandi Grigsby RN on 12/6/2021 at 10:13 AM

## 2021-12-06 NOTE — TELEPHONE ENCOUNTER
Please call pt:  Recent results:    Lab Results   Component Value Date    TSH 11.18 11/30/2021    TSH 13.54 07/07/2021     Lab Results   Component Value Date    T4 0.83 11/30/2021    T4 0.76 07/07/2021     Current levothyroxine dose: 100 mcg daily    Dosing needs adjustment.  Please instruct pt to start new dose:   levothyroxine 125 mcg daily  Pt should return for repeat labwork in 2months.  The prescription has been sent.

## 2022-03-08 ENCOUNTER — ANCILLARY PROCEDURE (OUTPATIENT)
Dept: GENERAL RADIOLOGY | Facility: CLINIC | Age: 57
End: 2022-03-08
Attending: PHYSICIAN ASSISTANT
Payer: COMMERCIAL

## 2022-03-08 ENCOUNTER — OFFICE VISIT (OUTPATIENT)
Dept: URGENT CARE | Facility: URGENT CARE | Age: 57
End: 2022-03-08
Payer: COMMERCIAL

## 2022-03-08 VITALS
DIASTOLIC BLOOD PRESSURE: 78 MMHG | BODY MASS INDEX: 20.88 KG/M2 | RESPIRATION RATE: 16 BRPM | TEMPERATURE: 97.8 F | HEART RATE: 65 BPM | SYSTOLIC BLOOD PRESSURE: 128 MMHG | WEIGHT: 145.5 LBS | OXYGEN SATURATION: 98 %

## 2022-03-08 DIAGNOSIS — M79.622 PAIN OF LEFT UPPER ARM: ICD-10-CM

## 2022-03-08 DIAGNOSIS — M79.622 PAIN OF LEFT UPPER ARM: Primary | ICD-10-CM

## 2022-03-08 PROCEDURE — 73030 X-RAY EXAM OF SHOULDER: CPT | Mod: LT | Performed by: RADIOLOGY

## 2022-03-08 PROCEDURE — 93000 ELECTROCARDIOGRAM COMPLETE: CPT | Performed by: PHYSICIAN ASSISTANT

## 2022-03-08 PROCEDURE — 99214 OFFICE O/P EST MOD 30 MIN: CPT | Performed by: PHYSICIAN ASSISTANT

## 2022-03-08 RX ORDER — CYCLOBENZAPRINE HCL 10 MG
10 TABLET ORAL 3 TIMES DAILY PRN
Qty: 30 TABLET | Refills: 0 | Status: SHIPPED | OUTPATIENT
Start: 2022-03-08 | End: 2022-04-07

## 2022-03-08 RX ORDER — IBUPROFEN 800 MG/1
800 TABLET, FILM COATED ORAL EVERY 8 HOURS PRN
Qty: 21 TABLET | Refills: 0 | Status: SHIPPED | OUTPATIENT
Start: 2022-03-08 | End: 2022-04-07

## 2022-03-08 NOTE — PROGRESS NOTES
SUBJECTIVE:  Chief Complaint   Patient presents with     Arm Pain     x 1 month, left arm from shoulder to elbow, achy pain, constant     Milton Gonzalez is a 56 year old male presents with a chief complaint of left shoulder pain.  The symptom began 2 months ago.  Triggered with movements and sleep (stomach sleeper).   The patient complained of moderate pain  and has not had decreased ROM.  Pain exacerbated by movement.  Relieved by ibuprofen.  He treated it initially with Ibuprofen. This is the first time this type of injury has occurred to this patient.     Past Medical History:   Diagnosis Date     Rotator cuff disorder 10/15/2008     Tobacco use disorder      Current Outpatient Medications   Medication Sig Dispense Refill     aspirin (ASA) 325 MG EC tablet Take 325 mg by mouth daily       levothyroxine (SYNTHROID/LEVOTHROID) 125 MCG tablet Take 1 tablet (125 mcg) by mouth daily 90 tablet 0     Social History     Tobacco Use     Smoking status: Former Smoker     Packs/day: 1.00     Years: 24.00     Pack years: 24.00     Types: Cigarettes     Quit date: 12/1/2018     Years since quitting: 3.2     Smokeless tobacco: Never Used   Substance Use Topics     Alcohol use: No       ROS:  Review of systems negative except as stated above.    EXAM:   /78 (BP Location: Right arm, Patient Position: Sitting, Cuff Size: Adult Regular)   Pulse 65   Temp 97.8  F (36.6  C) (Tympanic)   Resp 16   Wt 66 kg (145 lb 8 oz)   SpO2 98%   BMI 20.88 kg/m    Gen: healthy,alert,no distress  Extremity: anterior superior shoulder tender. Pain elicited with external rotation and elbow flexion  There is not compromise to the distal circulation.  Pulses are +2 and CRT is brisk  GENERAL APPEARANCE: healthy, alert and no distress  CHEST: clear to auscultation  CV: regular rate and rhythm  EXTREMITIES: peripheral pulses normal  MS: no gross deformities noted, no evidence of inflammation in joints, FROM in all extremities.  SKIN: no  suspicious lesions or rashes  NEURO: Normal strength and tone, sensory exam grossly normal, mentation intact and speech normal      EKG: NSR    X-ray image initially interpreted in clinic by me in order to rule out fracture/dislocation.  No evidence appreciated.      ASSESSMENT:   (M79.622) Pain of left upper arm  (primary encounter diagnosis)  Comment: tendonitis vs impingement vs strain.  Will treat conservatively  Plan: EKG 12-lead complete w/read - Clinics, XR         Shoulder Left G/E 3 Views, Orthopedic         Referral, ibuprofen (ADVIL/MOTRIN) 800 MG         tablet, cyclobenzaprine (FLEXERIL) 10 MG tablet      Red flags and emergent follow up discussed, and understood by patient  Follow up with PCP if symptoms worsen or fail to improve      Patient Instructions       Patient Education     RICE     Rest an injury, elevate it, and use ice and compression as directed.   RICE stands for rest, ice, compression, and elevation. These can limit pain and swelling after an injury. RICE may be recommended to help treat breaks (fractures), sprains, strains, and bruises or bumps.   Home care  Here are the details of RICE:    Rest. Limit the use of the injured body part. This helps prevent further damage to the body part and gives it time to heal. In some cases, you may need a sling, brace, splint, or cast to help keep the body part still until it has healed.    Ice. Applying ice right after an injury helps relieve pain and swelling. To make an ice pack, put ice cubes in a plastic bag that seals at the top. Wrap the bag in a clean, thin towel or cloth. Then place it over the injured area. Do this for 10 to 15 minutes every 3 to 4 hours. Continue for the next 1 to 3 days or until your symptoms improve. Never put ice directly on your skin. Don't ice an area longer than 15 minutes at a time.    Compression. Putting pressure on an injury helps reduce swelling and provides support. Wrap the injured area firmly with an  elastic bandage or wrap. Make sure not to wrap the bandage too tightly or you will cut off blood flow to the injured area. If your bandage loosens, rewrap it.    Elevation. Keeping an injury raised or elevated above the level of your heart reduces swelling, pain, and throbbing. For instance, if you have a broken leg, it may help to rest your leg on several pillows when sitting or lying down. Try to keep the injured area elevated as often as possible.  Follow-up care  Follow up with your healthcare provider, or as advised.  When to seek medical advice  Call your healthcare provider right away if any of these occur:    Fever of 100.4 F (38 C) or higher, or as directed by your healthcare provider    Chills    Increased pain or swelling in the injured body part    Injured body part becomes cold, blue, numb, or tingly    Signs of infection. These include warmth in the skin, redness, drainage, or bad smell coming from the injured body part.  [employer] last reviewed this educational content on 6/1/2018 2000-2021 [employer]. All rights reserved. This information is not intended as a substitute for professional medical care. Always follow your healthcare professional's instructions.           Patient Education     Shoulder Pain with Uncertain Cause   Shoulder pain can have many causes. Pain often comes from the structures that surround the shoulder joint. These are the joint capsule, ligaments, tendons, muscles, and bursa. Pain can also come from cartilage in the joint. Cartilage can become worn out or injured. It s important to know what s causing your pain so the healthcare provider can use the correct treatment. But sometimes it s difficult to find the exact cause of shoulder pain. You may need to see a specialist (orthopedist). You may also need special tests such as a CT scan or MRI. The provider may need to use special tools to look inside the joint (arthroscopy).  Shoulder pain can be treated with a  sling or a device that keeps your shoulder from moving. You can take an anti-inflammatory medicine such as ibuprofen to ease pain. You may need to do special shoulder exercises. Follow up with a specialist if the pain is severe or doesn t go away after a few weeks.  Home care  Follow these tips when caring for yourself at home:    If a sling was given to you, leave it in place for the time advised by your healthcare provider. If you aren t sure how long to wear it, ask for advice. If the sling becomes loose, adjust it so that your forearm is level with the ground. Your shoulder should feel well supported.    Put an ice pack on the injured area for 20 minutes every 1 to 2 hours the first day. You can make your own ice pack by putting ice cubes in a plastic bag. Wrap the bag in a thin towel. Continue with ice packs 3 to 4 times a day for the next 2 days. Then use the pack as needed to ease pain and swelling.    You may use acetaminophen or ibuprofen to control pain, unless another pain medicine was prescribed. If you have chronic liver or kidney disease, talk with your healthcare provider before using these medicines. Also talk with your provider if you ve ever had a stomach ulcer or digestive bleeding.    Shoulder pain may seem worse at night, when there is less to distract you from the pain. If you sleep on your side, try to keep weight off your painful shoulder. Propping pillows behind you may stop you from rolling over onto that shoulder during sleep.     Shoulder and elbow joints can become stiff if left in a sling for too long. You should start range of motion exercises about 7 to 10 days after the injury. Talk with your provider to find out what type of exercises to do and how soon to start.    You can take the sling off to shower or bathe.  Follow-up care  Follow up with your healthcare provider if you don t start to get better in the next 5 days.  When to seek medical advice  Call your healthcare provider  right away if any of these occur:    Pain or swelling gets worse or continues for more than a few days    Your hand or fingers become cold, blue, numb, or tingly    Large amount of bruising on your shoulder or upper arm    Trouble moving your hand or fingers    Weakness in your hand or fingers    Your shoulder becomes stiff    It feels like your shoulder is popping out    You are less able to do your daily activities  [employer] last reviewed this educational content on 1/1/2020 2000-2021 [employer]. All rights reserved. This information is not intended as a substitute for professional medical care. Always follow your healthcare professional's instructions.

## 2022-03-08 NOTE — PATIENT INSTRUCTIONS
Patient Education     RICE     Rest an injury, elevate it, and use ice and compression as directed.   RICE stands for rest, ice, compression, and elevation. These can limit pain and swelling after an injury. RICE may be recommended to help treat breaks (fractures), sprains, strains, and bruises or bumps.   Home care  Here are the details of RICE:    Rest. Limit the use of the injured body part. This helps prevent further damage to the body part and gives it time to heal. In some cases, you may need a sling, brace, splint, or cast to help keep the body part still until it has healed.    Ice. Applying ice right after an injury helps relieve pain and swelling. To make an ice pack, put ice cubes in a plastic bag that seals at the top. Wrap the bag in a clean, thin towel or cloth. Then place it over the injured area. Do this for 10 to 15 minutes every 3 to 4 hours. Continue for the next 1 to 3 days or until your symptoms improve. Never put ice directly on your skin. Don't ice an area longer than 15 minutes at a time.    Compression. Putting pressure on an injury helps reduce swelling and provides support. Wrap the injured area firmly with an elastic bandage or wrap. Make sure not to wrap the bandage too tightly or you will cut off blood flow to the injured area. If your bandage loosens, rewrap it.    Elevation. Keeping an injury raised or elevated above the level of your heart reduces swelling, pain, and throbbing. For instance, if you have a broken leg, it may help to rest your leg on several pillows when sitting or lying down. Try to keep the injured area elevated as often as possible.  Follow-up care  Follow up with your healthcare provider, or as advised.  When to seek medical advice  Call your healthcare provider right away if any of these occur:    Fever of 100.4 F (38 C) or higher, or as directed by your healthcare provider    Chills    Increased pain or swelling in the injured body part    Injured body part  becomes cold, blue, numb, or tingly    Signs of infection. These include warmth in the skin, redness, drainage, or bad smell coming from the injured body part.  [employer] last reviewed this educational content on 6/1/2018 2000-2021 [employer]. All rights reserved. This information is not intended as a substitute for professional medical care. Always follow your healthcare professional's instructions.           Patient Education     Shoulder Pain with Uncertain Cause   Shoulder pain can have many causes. Pain often comes from the structures that surround the shoulder joint. These are the joint capsule, ligaments, tendons, muscles, and bursa. Pain can also come from cartilage in the joint. Cartilage can become worn out or injured. It s important to know what s causing your pain so the healthcare provider can use the correct treatment. But sometimes it s difficult to find the exact cause of shoulder pain. You may need to see a specialist (orthopedist). You may also need special tests such as a CT scan or MRI. The provider may need to use special tools to look inside the joint (arthroscopy).  Shoulder pain can be treated with a sling or a device that keeps your shoulder from moving. You can take an anti-inflammatory medicine such as ibuprofen to ease pain. You may need to do special shoulder exercises. Follow up with a specialist if the pain is severe or doesn t go away after a few weeks.  Home care  Follow these tips when caring for yourself at home:    If a sling was given to you, leave it in place for the time advised by your healthcare provider. If you aren t sure how long to wear it, ask for advice. If the sling becomes loose, adjust it so that your forearm is level with the ground. Your shoulder should feel well supported.    Put an ice pack on the injured area for 20 minutes every 1 to 2 hours the first day. You can make your own ice pack by putting ice cubes in a plastic bag. Wrap the bag in a  thin towel. Continue with ice packs 3 to 4 times a day for the next 2 days. Then use the pack as needed to ease pain and swelling.    You may use acetaminophen or ibuprofen to control pain, unless another pain medicine was prescribed. If you have chronic liver or kidney disease, talk with your healthcare provider before using these medicines. Also talk with your provider if you ve ever had a stomach ulcer or digestive bleeding.    Shoulder pain may seem worse at night, when there is less to distract you from the pain. If you sleep on your side, try to keep weight off your painful shoulder. Propping pillows behind you may stop you from rolling over onto that shoulder during sleep.     Shoulder and elbow joints can become stiff if left in a sling for too long. You should start range of motion exercises about 7 to 10 days after the injury. Talk with your provider to find out what type of exercises to do and how soon to start.    You can take the sling off to shower or bathe.  Follow-up care  Follow up with your healthcare provider if you don t start to get better in the next 5 days.  When to seek medical advice  Call your healthcare provider right away if any of these occur:    Pain or swelling gets worse or continues for more than a few days    Your hand or fingers become cold, blue, numb, or tingly    Large amount of bruising on your shoulder or upper arm    Trouble moving your hand or fingers    Weakness in your hand or fingers    Your shoulder becomes stiff    It feels like your shoulder is popping out    You are less able to do your daily activities  [employer] last reviewed this educational content on 1/1/2020 2000-2021 [employer]. All rights reserved. This information is not intended as a substitute for professional medical care. Always follow your healthcare professional's instructions.

## 2022-03-10 ENCOUNTER — OFFICE VISIT (OUTPATIENT)
Dept: PEDIATRICS | Facility: CLINIC | Age: 57
End: 2022-03-10
Payer: COMMERCIAL

## 2022-03-10 VITALS
WEIGHT: 150 LBS | DIASTOLIC BLOOD PRESSURE: 70 MMHG | HEART RATE: 73 BPM | OXYGEN SATURATION: 100 % | BODY MASS INDEX: 21.52 KG/M2 | TEMPERATURE: 97.9 F | RESPIRATION RATE: 16 BRPM | SYSTOLIC BLOOD PRESSURE: 122 MMHG

## 2022-03-10 DIAGNOSIS — M25.512 ACUTE PAIN OF LEFT SHOULDER: Primary | ICD-10-CM

## 2022-03-10 DIAGNOSIS — M77.8 SHOULDER TENDONITIS, LEFT: ICD-10-CM

## 2022-03-10 DIAGNOSIS — E06.3 HYPOTHYROIDISM DUE TO HASHIMOTO'S THYROIDITIS: ICD-10-CM

## 2022-03-10 PROCEDURE — 99213 OFFICE O/P EST LOW 20 MIN: CPT | Performed by: NURSE PRACTITIONER

## 2022-03-10 RX ORDER — LEVOTHYROXINE SODIUM 125 UG/1
TABLET ORAL
Qty: 90 TABLET | Refills: 0 | Status: SHIPPED | OUTPATIENT
Start: 2022-03-10 | End: 2022-03-26

## 2022-03-10 NOTE — PROGRESS NOTES
Assessment & Plan     Acute pain of left shoulder  Referred to PT.  Encouraged patient to start using medication prescribed by PT.  Do not use sling is symptoms worsen.  Follow-up in orthopedics.  Can order MRI if needed--patient to reach out if he'd like this ordered.  - Physical Therapy Referral; Future    Shoulder tendonitis, left  - Physical Therapy Referral; Future         Return if symptoms worsen or fail to improve.    Carmen Henriquez NP  [facility] BERTA Durant is a 56 year old who presents for the following health issues:    Musculoskeletal Problem    History of Present Illness       Reason for visit:  Left arm pain  Symptom onset:  More than a month  Symptoms include:  Throbbing pain in left arm from shoulder to elbow  Symptom intensity:  Moderate  Symptom progression:  Worsening  Had these symptoms before:  No  What makes it worse:  When I m relaxing it seems to hurt more  What makes it better:  Not so far    He eats 2-3 servings of fruits and vegetables daily.He consumes 3 sweetened beverage(s) daily.He exercises with enough effort to increase his heart rate 30 to 60 minutes per day.  He exercises with enough effort to increase his heart rate 3 or less days per week.   He is taking medications regularly.     Pt saw urgent care on on 3/8/22. Was given a sling and medications and told to do PT. States it bothers him more when the arm is stationary.   Has not made appointment with orthopedics.  Has not picked up medication.  Is interested in PT and needs referral.      Review of Systems   Constitutional, HEENT, cardiovascular, pulmonary, gi and gu systems are negative, except as otherwise noted.      Objective    /70   Pulse 73   Temp 97.9  F (36.6  C) (Tympanic)   Resp 16   Wt 68 kg (150 lb)   SpO2 100%   BMI 21.52 kg/m    Body mass index is 21.52 kg/m .     Physical Exam   GENERAL: healthy, alert and no distress  RESP: lungs clear to auscultation - no rales, rhonchi  or wheezes  CV: regular rate and rhythm, normal S1 S2, no S3 or S4, no murmur, click or rub, no peripheral edema and peripheral pulses strong  MS: LUE exam shows normal strength and muscle mass, no deformities and ROM of all joints is normal but painful when arm is reached overhead and towards back..  Pain up on palpation of anterior shoulder.  PSYCH: mentation appears normal, affect normal/bright

## 2022-03-10 NOTE — TELEPHONE ENCOUNTER
Routing refill request to provider for review/approval because:  Labs out of range:    TSH   Date Value Ref Range Status   11/30/2021 11.18 (H) 0.40 - 4.00 mU/L Final   07/07/2021 13.54 (H) 0.40 - 4.00 mU/L Final      Isabella Rush RN

## 2022-03-10 NOTE — PATIENT INSTRUCTIONS
It was nice seeing you today.    -Diclofenac    Please let me know if you have any questions regarding today's visit!    Take care,    JANEE Henriquez DNP  Family Medicine

## 2022-03-11 NOTE — TELEPHONE ENCOUNTER
Called patient and LVM to return phone call to clinic in regards to scheduling appointment.     1st attempt.     Gila Wolff CMA

## 2022-03-17 ENCOUNTER — LAB (OUTPATIENT)
Dept: LAB | Facility: CLINIC | Age: 57
End: 2022-03-17
Payer: COMMERCIAL

## 2022-03-17 DIAGNOSIS — E06.3 HYPOTHYROIDISM DUE TO HASHIMOTO'S THYROIDITIS: ICD-10-CM

## 2022-03-17 PROCEDURE — 84439 ASSAY OF FREE THYROXINE: CPT

## 2022-03-17 PROCEDURE — 36415 COLL VENOUS BLD VENIPUNCTURE: CPT

## 2022-03-17 PROCEDURE — 84443 ASSAY THYROID STIM HORMONE: CPT

## 2022-03-18 LAB
T4 FREE SERPL-MCNC: 1.01 NG/DL (ref 0.76–1.46)
TSH SERPL DL<=0.005 MIU/L-ACNC: 10.02 MU/L (ref 0.4–4)

## 2022-03-26 ENCOUNTER — TELEPHONE (OUTPATIENT)
Dept: PEDIATRICS | Facility: CLINIC | Age: 57
End: 2022-03-26
Payer: COMMERCIAL

## 2022-03-26 DIAGNOSIS — E06.3 HYPOTHYROIDISM DUE TO HASHIMOTO'S THYROIDITIS: Primary | ICD-10-CM

## 2022-03-26 RX ORDER — LEVOTHYROXINE SODIUM 150 UG/1
150 TABLET ORAL DAILY
Qty: 90 TABLET | Refills: 1 | Status: SHIPPED | OUTPATIENT
Start: 2022-03-26 | End: 2022-04-03

## 2022-03-26 NOTE — TELEPHONE ENCOUNTER
Please call pt:  Recent results:    Lab Results   Component Value Date    TSH 10.02 03/17/2022    TSH 13.54 07/07/2021     Lab Results   Component Value Date    T4 1.01 03/17/2022    T4 0.76 07/07/2021     Current levothyroxine dose: 125 mcg daily    Dosing needs adjustment.  Please instruct pt to start new dose:   levothyroxine 150 mcg daily  Pt should return for repeat labwork in 2months--please schedule labdraw.  Please remind patient he should take levothyroxine daily, and on an empty stomach, about 1 hour before eating.  The prescription has been sent.

## 2022-03-28 NOTE — TELEPHONE ENCOUNTER
Updated patient.   He understands and agrees with the plan.     He will start the new dose.   Has lab only appointment already scheduled on 5/31/2022.

## 2022-03-28 NOTE — TELEPHONE ENCOUNTER
Covering while Dr Jimenez is out of the office - regarding patient's message: If the coffee use is consistent, it shouldn't impair his absorption or change levothyroxine levels.   Even with missing a couple doses intermittently, it would still make sense to make the changes as recommended by Dr Jimenez.    Juana Lala MD

## 2022-03-28 NOTE — TELEPHONE ENCOUNTER
Called and informed patient of Dr. Jimenez's message below. Patient tends to miss 2-3 doses of his levothyroxine monthly. He denies eating food after taking his levothyroxine but does drink black coffee and wonders if this might have effected his level.     Yasmine Maradiaga RN on 3/28/2022 at 10:51 AM

## 2022-03-30 ENCOUNTER — MYC MEDICAL ADVICE (OUTPATIENT)
Dept: PEDIATRICS | Facility: CLINIC | Age: 57
End: 2022-03-30
Payer: COMMERCIAL

## 2022-03-30 DIAGNOSIS — E06.3 HYPOTHYROIDISM DUE TO HASHIMOTO'S THYROIDITIS: ICD-10-CM

## 2022-04-03 RX ORDER — LEVOTHYROXINE SODIUM 150 UG/1
150 TABLET ORAL DAILY
Qty: 90 TABLET | Refills: 1 | COMMUNITY
Start: 2022-04-03 | End: 2022-11-30

## 2022-04-06 DIAGNOSIS — M79.622 PAIN OF LEFT UPPER ARM: ICD-10-CM

## 2022-04-07 RX ORDER — CYCLOBENZAPRINE HCL 10 MG
TABLET ORAL
Qty: 30 TABLET | Refills: 0 | Status: SHIPPED | OUTPATIENT
Start: 2022-04-07 | End: 2022-11-30

## 2022-04-07 RX ORDER — IBUPROFEN 800 MG/1
TABLET, FILM COATED ORAL
Qty: 30 TABLET | Refills: 0 | Status: SHIPPED | OUTPATIENT
Start: 2022-04-07 | End: 2024-01-23

## 2022-04-07 NOTE — TELEPHONE ENCOUNTER
Routing refill request to provider for review/approval because:  Drug not on the [facility] refill protocol   Drug not active on patient's medication list  Labs out of range:  CBC    Yasmine Maradiaga RN on 4/7/2022 at 11:06 AM

## 2022-05-31 ENCOUNTER — LAB (OUTPATIENT)
Dept: LAB | Facility: CLINIC | Age: 57
End: 2022-05-31
Payer: COMMERCIAL

## 2022-05-31 DIAGNOSIS — E06.3 HYPOTHYROIDISM DUE TO HASHIMOTO'S THYROIDITIS: ICD-10-CM

## 2022-05-31 LAB
T4 FREE SERPL-MCNC: 1.35 NG/DL (ref 0.76–1.46)
TSH SERPL DL<=0.005 MIU/L-ACNC: 6.72 MU/L (ref 0.4–4)

## 2022-05-31 PROCEDURE — 36415 COLL VENOUS BLD VENIPUNCTURE: CPT

## 2022-05-31 PROCEDURE — 84443 ASSAY THYROID STIM HORMONE: CPT

## 2022-05-31 PROCEDURE — 84439 ASSAY OF FREE THYROXINE: CPT

## 2022-06-06 ENCOUNTER — TELEPHONE (OUTPATIENT)
Dept: PEDIATRICS | Facility: CLINIC | Age: 57
End: 2022-06-06
Payer: COMMERCIAL

## 2022-06-06 DIAGNOSIS — E06.3 HYPOTHYROIDISM DUE TO HASHIMOTO'S THYROIDITIS: Primary | ICD-10-CM

## 2022-06-06 RX ORDER — LEVOTHYROXINE SODIUM 175 UG/1
175 TABLET ORAL DAILY
Qty: 90 TABLET | Refills: 1 | Status: SHIPPED | OUTPATIENT
Start: 2022-06-06 | End: 2022-12-05

## 2022-06-06 NOTE — TELEPHONE ENCOUNTER
Called and informed patient of Dr. Jimenez's message below. Patient will  the new prescription and take as prescribed. He will follow up with lab work in 2 months.     Yasmine Maradiaga RN on 6/6/2022 at 8:25 AM

## 2022-06-06 NOTE — TELEPHONE ENCOUNTER
Please call pt:  Recent results:    Lab Results   Component Value Date    TSH 6.72 05/31/2022    TSH 13.54 07/07/2021     Lab Results   Component Value Date    T4 1.35 05/31/2022    T4 0.76 07/07/2021     Current levothyroxine dose: 150 mcg daily    Dosing needs adjustment.  Please instruct pt to start new dose:   levothyroxine 175 mcg daily  Pt should return for repeat labwork in 2months.  The prescription has been sent.

## 2022-08-20 ENCOUNTER — HEALTH MAINTENANCE LETTER (OUTPATIENT)
Age: 57
End: 2022-08-20

## 2022-08-29 ENCOUNTER — HOSPITAL ENCOUNTER (EMERGENCY)
Facility: CLINIC | Age: 57
Discharge: LEFT BEFORE DISCHARGE | End: 2022-08-29
Payer: COMMERCIAL

## 2022-08-29 VITALS
DIASTOLIC BLOOD PRESSURE: 85 MMHG | RESPIRATION RATE: 18 BRPM | OXYGEN SATURATION: 96 % | TEMPERATURE: 98.1 F | BODY MASS INDEX: 21.35 KG/M2 | SYSTOLIC BLOOD PRESSURE: 130 MMHG | HEART RATE: 68 BPM | WEIGHT: 148.8 LBS

## 2022-08-29 NOTE — ED TRIAGE NOTES
Patient states he was setting up a machine that dispenses dish soap and the hose broke, and splashed the soap in his left eye. Patient was wearing safety goggles. Patient washed his eye for 1 hour at work.      Triage Assessment     Row Name 08/29/22 1245       Triage Assessment (Adult)    Airway WDL WDL       Respiratory WDL    Respiratory WDL WDL       Skin Circulation/Temperature WDL    Skin Circulation/Temperature WDL WDL       Cardiac WDL    Cardiac WDL WDL       Peripheral/Neurovascular WDL    Peripheral Neurovascular WDL WDL       Cognitive/Neuro/Behavioral WDL    Cognitive/Neuro/Behavioral WDL WDL

## 2022-11-20 ENCOUNTER — HEALTH MAINTENANCE LETTER (OUTPATIENT)
Age: 57
End: 2022-11-20

## 2022-11-28 SDOH — ECONOMIC STABILITY: INCOME INSECURITY: HOW HARD IS IT FOR YOU TO PAY FOR THE VERY BASICS LIKE FOOD, HOUSING, MEDICAL CARE, AND HEATING?: NOT HARD AT ALL

## 2022-11-28 SDOH — HEALTH STABILITY: PHYSICAL HEALTH
ON AVERAGE, HOW MANY DAYS PER WEEK DO YOU ENGAGE IN MODERATE TO STRENUOUS EXERCISE (LIKE A BRISK WALK)?: PATIENT DECLINED

## 2022-11-28 SDOH — ECONOMIC STABILITY: INCOME INSECURITY: IN THE LAST 12 MONTHS, WAS THERE A TIME WHEN YOU WERE NOT ABLE TO PAY THE MORTGAGE OR RENT ON TIME?: NO

## 2022-11-28 SDOH — HEALTH STABILITY: PHYSICAL HEALTH: ON AVERAGE, HOW MANY MINUTES DO YOU ENGAGE IN EXERCISE AT THIS LEVEL?: 0 MIN

## 2022-11-28 SDOH — ECONOMIC STABILITY: TRANSPORTATION INSECURITY
IN THE PAST 12 MONTHS, HAS LACK OF TRANSPORTATION KEPT YOU FROM MEETINGS, WORK, OR FROM GETTING THINGS NEEDED FOR DAILY LIVING?: NO

## 2022-11-28 SDOH — ECONOMIC STABILITY: TRANSPORTATION INSECURITY
IN THE PAST 12 MONTHS, HAS THE LACK OF TRANSPORTATION KEPT YOU FROM MEDICAL APPOINTMENTS OR FROM GETTING MEDICATIONS?: NO

## 2022-11-28 SDOH — ECONOMIC STABILITY: FOOD INSECURITY: WITHIN THE PAST 12 MONTHS, THE FOOD YOU BOUGHT JUST DIDN'T LAST AND YOU DIDN'T HAVE MONEY TO GET MORE.: NEVER TRUE

## 2022-11-28 SDOH — ECONOMIC STABILITY: FOOD INSECURITY: WITHIN THE PAST 12 MONTHS, YOU WORRIED THAT YOUR FOOD WOULD RUN OUT BEFORE YOU GOT MONEY TO BUY MORE.: NEVER TRUE

## 2022-11-28 ASSESSMENT — ENCOUNTER SYMPTOMS
ABDOMINAL PAIN: 0
MYALGIAS: 0
EYE PAIN: 0
HEMATOCHEZIA: 0
DIZZINESS: 0
NAUSEA: 0
CHILLS: 0
HEMATURIA: 0
DIARRHEA: 0
DYSURIA: 0
PALPITATIONS: 0
WEAKNESS: 0
FREQUENCY: 1
FEVER: 0
ARTHRALGIAS: 0
CONSTIPATION: 0
HEARTBURN: 0
JOINT SWELLING: 0
PARESTHESIAS: 0
SHORTNESS OF BREATH: 0
COUGH: 0
SORE THROAT: 0
NERVOUS/ANXIOUS: 0
HEADACHES: 0

## 2022-11-28 ASSESSMENT — LIFESTYLE VARIABLES
HOW OFTEN DO YOU HAVE SIX OR MORE DRINKS ON ONE OCCASION: NEVER
SKIP TO QUESTIONS 9-10: 1
HOW OFTEN DO YOU HAVE A DRINK CONTAINING ALCOHOL: MONTHLY OR LESS
HOW MANY STANDARD DRINKS CONTAINING ALCOHOL DO YOU HAVE ON A TYPICAL DAY: PATIENT DOES NOT DRINK
AUDIT-C TOTAL SCORE: 1

## 2022-11-28 ASSESSMENT — SOCIAL DETERMINANTS OF HEALTH (SDOH)
DO YOU BELONG TO ANY CLUBS OR ORGANIZATIONS SUCH AS CHURCH GROUPS UNIONS, FRATERNAL OR ATHLETIC GROUPS, OR SCHOOL GROUPS?: NO
IN A TYPICAL WEEK, HOW MANY TIMES DO YOU TALK ON THE PHONE WITH FAMILY, FRIENDS, OR NEIGHBORS?: ONCE A WEEK
HOW OFTEN DO YOU GET TOGETHER WITH FRIENDS OR RELATIVES?: ONCE A WEEK
HOW OFTEN DO YOU ATTEND CHURCH OR RELIGIOUS SERVICES?: 1 TO 4 TIMES PER YEAR

## 2022-11-30 ENCOUNTER — TELEPHONE (OUTPATIENT)
Dept: PEDIATRICS | Facility: CLINIC | Age: 57
End: 2022-11-30

## 2022-11-30 ENCOUNTER — OFFICE VISIT (OUTPATIENT)
Dept: PEDIATRICS | Facility: CLINIC | Age: 57
End: 2022-11-30
Payer: COMMERCIAL

## 2022-11-30 VITALS
TEMPERATURE: 98 F | OXYGEN SATURATION: 98 % | WEIGHT: 147 LBS | DIASTOLIC BLOOD PRESSURE: 84 MMHG | SYSTOLIC BLOOD PRESSURE: 132 MMHG | RESPIRATION RATE: 16 BRPM | HEART RATE: 61 BPM | BODY MASS INDEX: 21.05 KG/M2 | HEIGHT: 70 IN

## 2022-11-30 DIAGNOSIS — E06.3 HYPOTHYROIDISM DUE TO HASHIMOTO'S THYROIDITIS: ICD-10-CM

## 2022-11-30 DIAGNOSIS — Z12.5 SCREENING FOR PROSTATE CANCER: ICD-10-CM

## 2022-11-30 DIAGNOSIS — F17.200 TOBACCO USE DISORDER: ICD-10-CM

## 2022-11-30 DIAGNOSIS — F17.200 TOBACCO USE DISORDER: Primary | ICD-10-CM

## 2022-11-30 DIAGNOSIS — Z79.899 ENCOUNTER FOR LONG-TERM (CURRENT) USE OF MEDICATIONS: ICD-10-CM

## 2022-11-30 DIAGNOSIS — Z13.6 CARDIOVASCULAR SCREENING; LDL GOAL LESS THAN 160: ICD-10-CM

## 2022-11-30 DIAGNOSIS — Z00.00 ROUTINE GENERAL MEDICAL EXAMINATION AT A HEALTH CARE FACILITY: Primary | ICD-10-CM

## 2022-11-30 LAB
ALBUMIN SERPL BCG-MCNC: 4.8 G/DL (ref 3.5–5.2)
ALP SERPL-CCNC: 90 U/L (ref 40–129)
ALT SERPL W P-5'-P-CCNC: 24 U/L (ref 10–50)
ANION GAP SERPL CALCULATED.3IONS-SCNC: 13 MMOL/L (ref 7–15)
AST SERPL W P-5'-P-CCNC: 29 U/L (ref 10–50)
BILIRUB SERPL-MCNC: 0.5 MG/DL
BUN SERPL-MCNC: 16.8 MG/DL (ref 6–20)
CALCIUM SERPL-MCNC: 9.4 MG/DL (ref 8.6–10)
CHLORIDE SERPL-SCNC: 104 MMOL/L (ref 98–107)
CHOLEST SERPL-MCNC: 214 MG/DL
CREAT SERPL-MCNC: 0.98 MG/DL (ref 0.67–1.17)
DEPRECATED HCO3 PLAS-SCNC: 24 MMOL/L (ref 22–29)
GFR SERPL CREATININE-BSD FRML MDRD: >90 ML/MIN/1.73M2
GLUCOSE SERPL-MCNC: 99 MG/DL (ref 70–99)
HDLC SERPL-MCNC: 65 MG/DL
LDLC SERPL CALC-MCNC: 129 MG/DL
NONHDLC SERPL-MCNC: 149 MG/DL
POTASSIUM SERPL-SCNC: 4.8 MMOL/L (ref 3.4–5.3)
PROT SERPL-MCNC: 6.8 G/DL (ref 6.4–8.3)
PSA SERPL-MCNC: 0.3 NG/ML (ref 0–3.5)
SODIUM SERPL-SCNC: 141 MMOL/L (ref 136–145)
T4 FREE SERPL-MCNC: 1.34 NG/DL (ref 0.9–1.7)
TRIGL SERPL-MCNC: 101 MG/DL
TSH SERPL DL<=0.005 MIU/L-ACNC: 5.96 UIU/ML (ref 0.3–4.2)

## 2022-11-30 PROCEDURE — 80053 COMPREHEN METABOLIC PANEL: CPT | Performed by: INTERNAL MEDICINE

## 2022-11-30 PROCEDURE — 99213 OFFICE O/P EST LOW 20 MIN: CPT | Mod: 25 | Performed by: INTERNAL MEDICINE

## 2022-11-30 PROCEDURE — 99396 PREV VISIT EST AGE 40-64: CPT | Mod: 25 | Performed by: INTERNAL MEDICINE

## 2022-11-30 PROCEDURE — 80061 LIPID PANEL: CPT | Performed by: INTERNAL MEDICINE

## 2022-11-30 PROCEDURE — G0103 PSA SCREENING: HCPCS | Performed by: INTERNAL MEDICINE

## 2022-11-30 PROCEDURE — 84443 ASSAY THYROID STIM HORMONE: CPT | Performed by: INTERNAL MEDICINE

## 2022-11-30 PROCEDURE — 84439 ASSAY OF FREE THYROXINE: CPT | Performed by: INTERNAL MEDICINE

## 2022-11-30 PROCEDURE — 36415 COLL VENOUS BLD VENIPUNCTURE: CPT | Performed by: INTERNAL MEDICINE

## 2022-11-30 RX ORDER — VARENICLINE TARTRATE 1 MG/1
1 TABLET, FILM COATED ORAL 2 TIMES DAILY
Qty: 60 TABLET | Refills: 1 | Status: SHIPPED | OUTPATIENT
Start: 2022-11-30 | End: 2024-01-23

## 2022-11-30 ASSESSMENT — ENCOUNTER SYMPTOMS
JOINT SWELLING: 0
EYE PAIN: 0
NAUSEA: 0
SHORTNESS OF BREATH: 0
WEAKNESS: 0
DYSURIA: 0
DIZZINESS: 0
HEADACHES: 0
HEMATURIA: 0
CHILLS: 0
ABDOMINAL PAIN: 0
FREQUENCY: 1
HEARTBURN: 0
COUGH: 0
CONSTIPATION: 0
PARESTHESIAS: 0
SORE THROAT: 0
DIARRHEA: 0
MYALGIAS: 0
HEMATOCHEZIA: 0
ARTHRALGIAS: 0
FEVER: 0
NERVOUS/ANXIOUS: 0
PALPITATIONS: 0

## 2022-11-30 ASSESSMENT — PAIN SCALES - GENERAL: PAINLEVEL: NO PAIN (0)

## 2022-11-30 NOTE — PROGRESS NOTES
SUBJECTIVE:   CC: Bhargav is an 57 year old who presents for preventative health visit.     Patient has been advised of split billing requirements and indicates understanding: Yes  Healthy Habits:     Getting at least 3 servings of Calcium per day:  NO    Bi-annual eye exam:  Yes    Dental care twice a year:  NO    Sleep apnea or symptoms of sleep apnea:  None    Diet:  Regular (no restrictions)    Frequency of exercise:  2-3 days/week    Duration of exercise:  Less than 15 minutes    Taking medications regularly:  Yes    Medication side effects:  Not applicable    PHQ-2 Total Score: 0    Additional concerns today:  No      Today's PHQ-2 Score:   PHQ-2 (  [employer]) 2022   Q1: Little interest or pleasure in doing things 0   Q2: Feeling down, depressed or hopeless 0   PHQ-2 Score 0   PHQ-2 Total Score (12-17 Years)- Positive if 3 or more points; Administer PHQ-A if positive -   Q1: Little interest or pleasure in doing things Not at all   Q2: Feeling down, depressed or hopeless Not at all   PHQ-2 Score 0       Have you ever done Advance Care Planning? (For example, a Health Directive, POLST, or a discussion with a medical provider or your loved ones about your wishes): No, advance care planning information given to patient to review.  Patient declined advance care planning discussion at this time.    Social History     Tobacco Use     Smoking status: Every Day     Packs/day: 1.00     Years: 30.00     Pack years: 30.00     Types: Cigarettes     Last attempt to quit: 2018     Years since quittin.0     Smokeless tobacco: Never   Substance Use Topics     Alcohol use: No         Alcohol Use 2022   Prescreen: >3 drinks/day or >7 drinks/week? No   Prescreen: >3 drinks/day or >7 drinks/week? -       Last PSA:   PSA   Date Value Ref Range Status   2018 0.32 0 - 4 ug/L Final     Comment:     Assay Method:  Chemiluminescence using Siemens Vista analyzer       Reviewed orders with patient. Reviewed health  maintenance and updated orders accordingly - Yes  Patient Active Problem List   Diagnosis     Tobacco use disorder     CARDIOVASCULAR SCREENING; LDL GOAL LESS THAN 160     Chronic low back pain     Hypothyroidism due to Hashimoto's thyroiditis     Past Surgical History:   Procedure Laterality Date     COLONOSCOPY       COLONOSCOPY N/A 2021    Procedure: COLONOSCOPY;  Surgeon: Tracy Boateng MD;  Location:  GI     ENT SURGERY      tonsils     NO HISTORY OF SURGERY         Social History     Tobacco Use     Smoking status: Every Day     Packs/day: 1.00     Years: 30.00     Pack years: 30.00     Types: Cigarettes     Last attempt to quit: 2018     Years since quittin.0     Smokeless tobacco: Never   Substance Use Topics     Alcohol use: No     Family History   Problem Relation Age of Onset     Hypertension Mother      Diabetes Father      Cerebrovascular Disease Maternal Grandfather      Cerebrovascular Disease Maternal Grandmother      Cancer - colorectal No family hx of      Prostate Cancer No family hx of      Colon Cancer No family hx of          Current Outpatient Medications   Medication Sig Dispense Refill     aspirin (ASA) 325 MG EC tablet Take 325 mg by mouth daily       ibuprofen (ADVIL/MOTRIN) 800 MG tablet TAKE 1 TABLET(800 MG) BY MOUTH EVERY 8 HOURS AS NEEDED FOR INFLAMMATORY PAIN 30 tablet 0     levothyroxine (SYNTHROID/LEVOTHROID) 175 MCG tablet Take 1 tablet (175 mcg) by mouth daily 90 tablet 1     varenicline (CHANTIX NAYELY) 0.5 MG X 11 & 1 MG X 42 tablet Take 0.5 mg tab daily for 3 days, THEN 0.5 mg tab twice daily for 4 days, THEN 1 mg twice daily. 53 tablet 0     varenicline (CHANTIX) 1 MG tablet Take 1 tablet (1 mg) by mouth 2 times daily 60 tablet 1       Reviewed and updated as needed this visit by clinical staff   Tobacco  Allergies  Meds              Reviewed and updated as needed this visit by Provider                   Review of Systems   Constitutional: Negative for  "chills and fever.   HENT: Negative for congestion, ear pain, hearing loss and sore throat.    Eyes: Negative for pain and visual disturbance.   Respiratory: Negative for cough and shortness of breath.    Cardiovascular: Negative for chest pain, palpitations and peripheral edema.   Gastrointestinal: Negative for abdominal pain, constipation, diarrhea, heartburn, hematochezia and nausea.   Genitourinary: Positive for frequency. Negative for dysuria, genital sores, hematuria, impotence, penile discharge and urgency.   Musculoskeletal: Negative for arthralgias, joint swelling and myalgias.   Skin: Negative for rash.   Neurological: Negative for dizziness, weakness, headaches and paresthesias.   Psychiatric/Behavioral: Negative for mood changes. The patient is not nervous/anxious.        OBJECTIVE:   /84   Pulse 61   Temp 98  F (36.7  C)   Resp 16   Ht 1.778 m (5' 10\")   Wt 66.7 kg (147 lb)   SpO2 98%   BMI 21.09 kg/m      Physical Exam  GENERAL: healthy, alert and no distress  EYES: Eyes grossly normal to inspection, PERRL and conjunctivae and sclerae normal  HENT: ear canals and TM's normal, nose and mouth without ulcers or lesions  NECK: no adenopathy, no asymmetry, masses, or scars and thyroid normal to palpation  RESP: lungs clear to auscultation - no rales, rhonchi or wheezes  CV: regular rate and rhythm, normal S1 S2, no S3 or S4, no murmur, click or rub, no peripheral edema and peripheral pulses strong  ABDOMEN: soft, nontender, no hepatosplenomegaly, no masses and bowel sounds normal  MS: no gross musculoskeletal defects noted, no edema  SKIN: no suspicious lesions or rashes  NEURO: Normal strength and tone, mentation intact and speech normal  PSYCH: mentation appears normal, affect normal/bright    ASSESSMENT/PLAN:   (Z00.00) Routine general medical examination at a health care facility  (primary encounter diagnosis)    (E03.8,  E06.3) Hypothyroidism due to Hashimoto's thyroiditis  Comment:   Plan: " TSH with free T4 reflex          (F17.200) Tobacco use disorder  Comment: 30pk year smoking hx.  Discussed lung ca screening/he will check coverage and let us know if he would like to proceed.  Interested in quitting-start chantix/Medication side effects discussed.   Plan: varenicline (CHANTIX NAYELY) 0.5 MG X 11 & 1 MG X         42 tablet, varenicline (CHANTIX) 1 MG tablet          (Z13.6) CARDIOVASCULAR SCREENING; LDL GOAL LESS THAN 160  Comment:   Plan: Comprehensive metabolic panel (BMP + Alb, Alk         Phos, ALT, AST, Total. Bili, TP), Lipid panel         reflex to direct LDL Fasting          (Z12.5) Screening for prostate cancer  Comment:   Plan: PSA, screen            COUNSELING:   Reviewed preventive health counseling, as reflected in patient instructions       Regular exercise       Healthy diet/nutrition       Colorectal cancer screening       Prostate cancer screening        He reports that he has been smoking cigarettes. He has a 30.00 pack-year smoking history. He has never used smokeless tobacco.            Eldon Jimenez MD  [facility]

## 2022-11-30 NOTE — TELEPHONE ENCOUNTER
Central Prior Authorization Team   Phone: [phone]      PRIOR AUTHORIZATION DENIED    Medication: varenicline (CHANTIX) 1 MG tablet - EPA DENIED     Denial Date: 11/30/2022    Denial Rational:       Appeal Information:

## 2022-11-30 NOTE — TELEPHONE ENCOUNTER
Central Prior Authorization Team   Phone: [phone]      PRIOR AUTHORIZATION DENIED    Medication: varenicline (CHANTIX NAYELY) 0.5 MG X 11 & 1 MG X 42 tablet - EPA DENIED     Denial Date: 11/30/2022    Denial Rational:         Appeal Information:

## 2022-11-30 NOTE — TELEPHONE ENCOUNTER
Please call pt--his insurance has denied coverage for chantix,--he does have coverage for nicotine patch or Zyban.   We can substitute either of these medications if he agrees    zyban is an oral medication generally taken for 2-3 months to reduce nicotine cravings while he is quitting

## 2022-11-30 NOTE — TELEPHONE ENCOUNTER
Pt returning call.     Read message from Dr. Jimenez below to pt.     Pt would like to try Zyban. Pharmacy verified.

## 2022-12-01 RX ORDER — BUPROPION HYDROCHLORIDE 150 MG/1
TABLET, FILM COATED, EXTENDED RELEASE ORAL
Qty: 60 TABLET | Refills: 2 | Status: SHIPPED | OUTPATIENT
Start: 2022-12-01 | End: 2024-01-23

## 2022-12-05 DIAGNOSIS — E06.3 HYPOTHYROIDISM DUE TO HASHIMOTO'S THYROIDITIS: ICD-10-CM

## 2022-12-05 RX ORDER — LEVOTHYROXINE SODIUM 175 UG/1
175 TABLET ORAL DAILY
Qty: 90 TABLET | Refills: 3 | Status: SHIPPED | OUTPATIENT
Start: 2022-12-05 | End: 2024-01-28

## 2023-04-17 DIAGNOSIS — E06.3 HYPOTHYROIDISM DUE TO HASHIMOTO'S THYROIDITIS: ICD-10-CM

## 2023-04-19 RX ORDER — LEVOTHYROXINE SODIUM 175 UG/1
TABLET ORAL
Qty: 90 TABLET | Refills: 3 | OUTPATIENT
Start: 2023-04-19

## 2023-04-19 NOTE — TELEPHONE ENCOUNTER
Refill of levothyroxine denied, refills on file at requesting pharmacy   sent on 12/5/2022 #90 with 3 refills     Ayala Murray, Registered Nurse  [facility]

## 2023-10-30 ENCOUNTER — PATIENT OUTREACH (OUTPATIENT)
Dept: GASTROENTEROLOGY | Facility: CLINIC | Age: 58
End: 2023-10-30
Payer: COMMERCIAL

## 2024-01-22 ASSESSMENT — ENCOUNTER SYMPTOMS
NERVOUS/ANXIOUS: 0
HEMATURIA: 0
HEARTBURN: 0
MYALGIAS: 1
DYSURIA: 0
CONSTIPATION: 0
NAUSEA: 0
HEADACHES: 0
ARTHRALGIAS: 1
SORE THROAT: 0
DIARRHEA: 0
SHORTNESS OF BREATH: 0
ABDOMINAL PAIN: 0
PARESTHESIAS: 0
FEVER: 0
HEMATOCHEZIA: 0
WEAKNESS: 0
EYE PAIN: 0
PALPITATIONS: 0
FREQUENCY: 1
DIZZINESS: 0
COUGH: 0
JOINT SWELLING: 1
CHILLS: 0

## 2024-01-23 ENCOUNTER — OFFICE VISIT (OUTPATIENT)
Dept: PEDIATRICS | Facility: CLINIC | Age: 59
End: 2024-01-23
Payer: COMMERCIAL

## 2024-01-23 VITALS
DIASTOLIC BLOOD PRESSURE: 85 MMHG | TEMPERATURE: 97.9 F | HEIGHT: 70 IN | WEIGHT: 161.5 LBS | SYSTOLIC BLOOD PRESSURE: 150 MMHG | RESPIRATION RATE: 14 BRPM | OXYGEN SATURATION: 97 % | BODY MASS INDEX: 23.12 KG/M2 | HEART RATE: 65 BPM

## 2024-01-23 DIAGNOSIS — F17.200 TOBACCO USE DISORDER: ICD-10-CM

## 2024-01-23 DIAGNOSIS — I10 HYPERTENSION, UNSPECIFIED TYPE: ICD-10-CM

## 2024-01-23 DIAGNOSIS — Z13.6 CARDIOVASCULAR SCREENING; LDL GOAL LESS THAN 160: ICD-10-CM

## 2024-01-23 DIAGNOSIS — E06.3 HYPOTHYROIDISM DUE TO HASHIMOTO'S THYROIDITIS: ICD-10-CM

## 2024-01-23 DIAGNOSIS — Z00.00 ROUTINE GENERAL MEDICAL EXAMINATION AT A HEALTH CARE FACILITY: Primary | ICD-10-CM

## 2024-01-23 LAB
ALBUMIN SERPL BCG-MCNC: 4.7 G/DL (ref 3.5–5.2)
ALP SERPL-CCNC: 97 U/L (ref 40–150)
ALT SERPL W P-5'-P-CCNC: 18 U/L (ref 0–70)
ANION GAP SERPL CALCULATED.3IONS-SCNC: 12 MMOL/L (ref 7–15)
AST SERPL W P-5'-P-CCNC: 27 U/L (ref 0–45)
BILIRUB SERPL-MCNC: 0.4 MG/DL
BUN SERPL-MCNC: 20.1 MG/DL (ref 6–20)
CALCIUM SERPL-MCNC: 9.2 MG/DL (ref 8.6–10)
CHLORIDE SERPL-SCNC: 100 MMOL/L (ref 98–107)
CHOLEST SERPL-MCNC: 180 MG/DL
CREAT SERPL-MCNC: 1.01 MG/DL (ref 0.67–1.17)
DEPRECATED HCO3 PLAS-SCNC: 26 MMOL/L (ref 22–29)
EGFRCR SERPLBLD CKD-EPI 2021: 87 ML/MIN/1.73M2
FASTING STATUS PATIENT QL REPORTED: ABNORMAL
GLUCOSE SERPL-MCNC: 89 MG/DL (ref 70–99)
HDLC SERPL-MCNC: 48 MG/DL
LDLC SERPL CALC-MCNC: 114 MG/DL
NONHDLC SERPL-MCNC: 132 MG/DL
POTASSIUM SERPL-SCNC: 4.3 MMOL/L (ref 3.4–5.3)
PROT SERPL-MCNC: 6.7 G/DL (ref 6.4–8.3)
SODIUM SERPL-SCNC: 138 MMOL/L (ref 135–145)
TRIGL SERPL-MCNC: 89 MG/DL
TSH SERPL DL<=0.005 MIU/L-ACNC: 3.04 UIU/ML (ref 0.3–4.2)

## 2024-01-23 PROCEDURE — 99396 PREV VISIT EST AGE 40-64: CPT | Mod: 25 | Performed by: INTERNAL MEDICINE

## 2024-01-23 PROCEDURE — 90686 IIV4 VACC NO PRSV 0.5 ML IM: CPT | Performed by: INTERNAL MEDICINE

## 2024-01-23 PROCEDURE — 99213 OFFICE O/P EST LOW 20 MIN: CPT | Mod: 25 | Performed by: INTERNAL MEDICINE

## 2024-01-23 PROCEDURE — 80061 LIPID PANEL: CPT | Performed by: INTERNAL MEDICINE

## 2024-01-23 PROCEDURE — 80053 COMPREHEN METABOLIC PANEL: CPT | Performed by: INTERNAL MEDICINE

## 2024-01-23 PROCEDURE — 90471 IMMUNIZATION ADMIN: CPT | Performed by: INTERNAL MEDICINE

## 2024-01-23 PROCEDURE — 36415 COLL VENOUS BLD VENIPUNCTURE: CPT | Performed by: INTERNAL MEDICINE

## 2024-01-23 PROCEDURE — 84443 ASSAY THYROID STIM HORMONE: CPT | Performed by: INTERNAL MEDICINE

## 2024-01-23 RX ORDER — HYDROCHLOROTHIAZIDE 25 MG/1
25 TABLET ORAL DAILY
Qty: 90 TABLET | Refills: 3 | Status: SHIPPED | OUTPATIENT
Start: 2024-01-23 | End: 2024-08-16

## 2024-01-23 RX ORDER — BUPROPION HYDROCHLORIDE 150 MG/1
TABLET, EXTENDED RELEASE ORAL
Qty: 120 TABLET | Refills: 1 | Status: SHIPPED | OUTPATIENT
Start: 2024-01-23

## 2024-01-23 RX ORDER — NAPROXEN SODIUM 220 MG
220 TABLET ORAL 2 TIMES DAILY WITH MEALS
COMMUNITY

## 2024-01-23 ASSESSMENT — ENCOUNTER SYMPTOMS
DIZZINESS: 0
EYE PAIN: 0
DYSURIA: 0
NAUSEA: 0
MYALGIAS: 1
DIARRHEA: 0
PALPITATIONS: 0
FEVER: 0
SORE THROAT: 0
HEADACHES: 0
JOINT SWELLING: 1
WEAKNESS: 0
SHORTNESS OF BREATH: 0
HEMATURIA: 0
COUGH: 0
ARTHRALGIAS: 1
CHILLS: 0
FREQUENCY: 1
ABDOMINAL PAIN: 0
CONSTIPATION: 0
NERVOUS/ANXIOUS: 0

## 2024-01-23 ASSESSMENT — PAIN SCALES - GENERAL: PAINLEVEL: NO PAIN (0)

## 2024-01-23 NOTE — PATIENT INSTRUCTIONS
Smoking--set quit date, start taking bupropion 1 week prior to quit date.    Blood pressure--start hydrochlorothiazide 25mg daily and return for nurse visit in 2 weeks      Preventive Health Recommendations  Male Ages 50 - 64    Yearly exam:             See your health care provider every year in order to  o   Review health changes.   o   Discuss preventive care.    o   Review your medicines if your doctor has prescribed any.   Have a cholesterol test every 5 years, or more frequently if you are at risk for high cholesterol/heart disease.   Have a diabetes test (fasting glucose) every three years. If you are at risk for diabetes, you should have this test more often.   Have a colonoscopy at age 45, or have a yearly FIT test (stool test). These exams will check for colon cancer.    Talk with your health care provider about whether or not a prostate cancer screening test (PSA) is right for you.  You should be tested each year for STDs (sexually transmitted diseases), if you re at risk.     Shots: Get a flu shot each year. Get a tetanus shot every 10 years.     Nutrition:  Eat at least 5 servings of fruits and vegetables daily.   Eat whole-grain bread, whole-wheat pasta and brown rice instead of white grains and rice.   Get adequate Calcium and Vitamin D.     Lifestyle  Exercise for at least 150 minutes a week (30 minutes a day, 5 days a week). This will help you control your weight and prevent disease.   Limit alcohol to one drink per day.   No smoking.   Wear sunscreen to prevent skin cancer.   See your dentist every six months for an exam and cleaning.   See your eye doctor every 1 to 2 years.

## 2024-01-23 NOTE — PROGRESS NOTES
Preventive Care Visit  [facility] BERTA Jimenez MD, Internal Medicine - Pediatrics  2024      SUBJECTIVE:   Bhargav is a 58 year old, presenting for the following:  Physical        2024     1:58 PM   Additional Questions   Roomed by Carole ABAD   Accompanied by NA         2024     1:58 PM   Patient Reported Additional Medications   Patient reports taking the following new medications None     Healthy Habits:     Getting at least 3 servings of Calcium per day:  NO    Bi-annual eye exam:  NO    Dental care twice a year:  NO    Sleep apnea or symptoms of sleep apnea:  Daytime drowsiness    Diet:  Regular (no restrictions)    Frequency of exercise:  None    Taking medications regularly:  Yes    Medication side effects:  Not applicable and None    Additional concerns today:  Yes      Today's PHQ-2 Score:       2024     9:16 AM   PHQ-2 (  [employer])   Q1: Little interest or pleasure in doing things 0   Q2: Feeling down, depressed or hopeless 0   PHQ-2 Score 0   Q1: Little interest or pleasure in doing things Not at all   Q2: Feeling down, depressed or hopeless Not at all   PHQ-2 Score 0         Social History     Tobacco Use    Smoking status: Every Day     Packs/day: 1.00     Years: 30.00     Additional pack years: 0.00     Total pack years: 30.00     Types: Cigarettes     Last attempt to quit: 2018     Years since quittin.1    Smokeless tobacco: Never   Substance Use Topics    Alcohol use: No           2024     9:16 AM   Alcohol Use   Prescreen: >3 drinks/day or >7 drinks/week? No       Last PSA:   PSA   Date Value Ref Range Status   2018 0.32 0 - 4 ug/L Final     Comment:     Assay Method:  Chemiluminescence using Siemens Vista analyzer     Prostate Specific Antigen Screen   Date Value Ref Range Status   2022 0.30 0.00 - 3.50 ng/mL Final       Reviewed orders with patient. Reviewed health maintenance and updated orders accordingly - Yes  Patient Active  Problem List   Diagnosis    Tobacco use disorder    CARDIOVASCULAR SCREENING; LDL GOAL LESS THAN 160    Chronic low back pain    Hypothyroidism due to Hashimoto's thyroiditis     Past Surgical History:   Procedure Laterality Date    COLONOSCOPY      COLONOSCOPY N/A 2021    Procedure: COLONOSCOPY;  Surgeon: Tracy Boateng MD;  Location:  GI    ENT SURGERY      tonsils    NO HISTORY OF SURGERY         Social History     Tobacco Use    Smoking status: Every Day     Packs/day: 1.00     Years: 30.00     Additional pack years: 0.00     Total pack years: 30.00     Types: Cigarettes     Last attempt to quit: 2018     Years since quittin.1    Smokeless tobacco: Never   Substance Use Topics    Alcohol use: No     Family History   Problem Relation Age of Onset    Hypertension Mother     Diabetes Father     Cerebrovascular Disease Maternal Grandfather     Cerebrovascular Disease Maternal Grandmother     Cancer - colorectal No family hx of     Prostate Cancer No family hx of     Colon Cancer No family hx of          Current Outpatient Medications   Medication Sig Dispense Refill    buPROPion (WELLBUTRIN SR) 150 MG 12 hr tablet 1 tablet daily for 5 days then increase to 1 tab BID (start 1 week prior to quit date) 120 tablet 1    hydrochlorothiazide (HYDRODIURIL) 25 MG tablet Take 1 tablet (25 mg) by mouth daily 90 tablet 3    levothyroxine (SYNTHROID/LEVOTHROID) 175 MCG tablet Take 1 tablet (175 mcg) by mouth daily 90 tablet 3    naproxen sodium (ANAPROX) 220 MG tablet Take 220 mg by mouth 2 times daily (with meals)         Reviewed and updated as needed this visit by clinical staff   Tobacco  Allergies  Meds              Reviewed and updated as needed this visit by Provider                    Review of Systems   Constitutional:  Negative for chills and fever.   HENT:  Negative for congestion, ear pain, hearing loss and sore throat.    Eyes:  Negative for pain and visual disturbance.   Respiratory:   "Negative for cough and shortness of breath.    Cardiovascular:  Negative for chest pain and palpitations.   Gastrointestinal:  Negative for abdominal pain, constipation, diarrhea and nausea.   Genitourinary:  Positive for frequency. Negative for dysuria, genital sores, hematuria, penile discharge and urgency.   Musculoskeletal:  Positive for arthralgias, joint swelling and myalgias.   Skin:  Negative for rash.   Neurological:  Negative for dizziness, weakness and headaches.   Psychiatric/Behavioral:  The patient is not nervous/anxious.          OBJECTIVE:   BP (!) 150/85 (BP Location: Left arm, Patient Position: Sitting, Cuff Size: Adult Regular)   Pulse 65   Temp 97.9  F (36.6  C) (Tympanic)   Resp 14   Ht 1.778 m (5' 10\")   Wt 73.3 kg (161 lb 8 oz)   SpO2 97%   BMI 23.17 kg/m     Estimated body mass index is 23.17 kg/m  as calculated from the following:    Height as of this encounter: 1.778 m (5' 10\").    Weight as of this encounter: 73.3 kg (161 lb 8 oz).  Physical Exam  GENERAL: alert and no distress  EYES: Eyes grossly normal to inspection, PERRL and conjunctivae and sclerae normal  HENT: ear canals and TM's normal, nose and mouth without ulcers or lesions  NECK: no adenopathy, no asymmetry, masses, or scars  RESP: lungs clear to auscultation - no rales, rhonchi or wheezes  CV: regular rate and rhythm, normal S1 S2, no S3 or S4, no murmur, click or rub, no peripheral edema  ABDOMEN: soft, nontender, no hepatosplenomegaly, no masses and bowel sounds normal  MS: no gross musculoskeletal defects noted, no edema  SKIN: no suspicious lesions or rashes  NEURO: Normal strength and tone, mentation intact and speech normal  PSYCH: mentation appears normal, affect normal/bright  LYMPH: no cervical, supraclavicular, axillary, or inguinal adenopathy        ASSESSMENT/PLAN:   (Z00.00) Routine general medical examination at a health care facility  (primary encounter diagnosis)    (I10) Hypertension, unspecified " type  Comment: new dx.  Start HCTZ, rtc 2wks for rn bp check  Plan: hydrochlorothiazide (HYDRODIURIL) 25 MG tablet          (E03.8,  E06.3) Hypothyroidism due to Hashimoto's thyroiditis  Comment: due for labwork  Plan: TSH WITH FREE T4 REFLEX          (F17.200) Tobacco use disorder  Comment: encouraged to set quit date; discussed medication options-start zyban prior to quit date.  Use and side effects reviewed.  Plan: buPROPion (WELLBUTRIN SR) 150 MG 12 hr tablet          (Z13.6) CARDIOVASCULAR SCREENING; LDL GOAL LESS THAN 160  Comment:   Plan: Lipid panel reflex to direct LDL Fasting,         Comprehensive metabolic panel (BMP + Alb, Alk         Phos, ALT, AST, Total. Bili, TP)              Counseling  Reviewed preventive health counseling, as reflected in patient instructions       Regular exercise       Healthy diet/nutrition       Colorectal cancer screening       Prostate cancer screening        He reports that he has been smoking cigarettes. He has a 30 pack-year smoking history. He has never used smokeless tobacco.  Nicotine/Tobacco Cessation Plan  Pharmacotherapies : bupropion (Zyban)            Signed Electronically by: Eldon Jimenez MD

## 2024-01-28 ENCOUNTER — MYC REFILL (OUTPATIENT)
Dept: PEDIATRICS | Facility: CLINIC | Age: 59
End: 2024-01-28
Payer: COMMERCIAL

## 2024-01-28 DIAGNOSIS — E06.3 HYPOTHYROIDISM DUE TO HASHIMOTO'S THYROIDITIS: ICD-10-CM

## 2024-01-28 RX ORDER — LEVOTHYROXINE SODIUM 175 UG/1
175 TABLET ORAL DAILY
Qty: 90 TABLET | Refills: 3 | Status: SHIPPED | OUTPATIENT
Start: 2024-01-28 | End: 2024-08-16

## 2024-01-29 RX ORDER — LEVOTHYROXINE SODIUM 175 UG/1
175 TABLET ORAL DAILY
Qty: 90 TABLET | Refills: 3 | OUTPATIENT
Start: 2024-01-29

## 2024-02-06 ENCOUNTER — ALLIED HEALTH/NURSE VISIT (OUTPATIENT)
Dept: PEDIATRICS | Facility: CLINIC | Age: 59
End: 2024-02-06
Payer: COMMERCIAL

## 2024-02-06 VITALS — HEART RATE: 64 BPM | SYSTOLIC BLOOD PRESSURE: 145 MMHG | DIASTOLIC BLOOD PRESSURE: 85 MMHG

## 2024-02-06 DIAGNOSIS — I10 HYPERTENSION, UNSPECIFIED TYPE: Primary | ICD-10-CM

## 2024-02-06 PROCEDURE — 99207 PR NO CHARGE NURSE ONLY: CPT

## 2024-02-06 NOTE — PROGRESS NOTES
"Milton Gonzalez is a 58 year old patient who comes in today for a Blood Pressure check.  Initial BP:  BP (!) 142/85 (BP Location: Right arm, Patient Position: Sitting, Cuff Size: Adult Regular)   Pulse 64      64  Disposition: results routed to provider    Dr Jimenez: Pt denies symptoms. Tolerating hydrochlorothiazide, with expected increase in urination during rest (pt taking before bedtime). Counseled to consider different time of take based on affects on sleep or daily activities. Pt open to medication adjustments (dose increase or med add-on).      - Tomer \"Carlin\" Yan (he/him/his), RN - Patient Advocate Liason (PAL)  [facility]    "

## 2024-02-08 PROBLEM — I10 HYPERTENSION, UNSPECIFIED TYPE: Status: ACTIVE | Noted: 2024-02-08

## 2024-02-08 RX ORDER — LISINOPRIL 10 MG/1
10 TABLET ORAL DAILY
Qty: 90 TABLET | Refills: 3 | Status: SHIPPED | OUTPATIENT
Start: 2024-02-08 | End: 2024-08-16

## 2024-02-08 NOTE — PROGRESS NOTES
Called and relayed provider recommendation below. Patient verbalized understanding, agreed with plan. Scheduled for lab only and BP check 2/23/24.     Santos DUKES RN 2/8/2024 at 2:29 PM

## 2024-02-08 NOTE — PROGRESS NOTES
Please call Bhargav.  Recommend:     Start taking HCTZ each morning, rather than at night      Start lisinopril 10mg each morning     Return for nurse BP check in 2 weeks and will need lab-only visit to recheck BMP

## 2024-02-23 ENCOUNTER — ALLIED HEALTH/NURSE VISIT (OUTPATIENT)
Dept: PEDIATRICS | Facility: CLINIC | Age: 59
End: 2024-02-23
Payer: COMMERCIAL

## 2024-02-23 ENCOUNTER — LAB (OUTPATIENT)
Dept: LAB | Facility: CLINIC | Age: 59
End: 2024-02-23
Payer: COMMERCIAL

## 2024-02-23 VITALS — HEART RATE: 66 BPM | SYSTOLIC BLOOD PRESSURE: 130 MMHG | DIASTOLIC BLOOD PRESSURE: 80 MMHG | OXYGEN SATURATION: 98 %

## 2024-02-23 DIAGNOSIS — I10 HYPERTENSION, UNSPECIFIED TYPE: ICD-10-CM

## 2024-02-23 DIAGNOSIS — I10 HYPERTENSION, UNSPECIFIED TYPE: Primary | ICD-10-CM

## 2024-02-23 LAB
ANION GAP SERPL CALCULATED.3IONS-SCNC: 13 MMOL/L (ref 7–15)
BUN SERPL-MCNC: 15.2 MG/DL (ref 6–20)
CALCIUM SERPL-MCNC: 9.2 MG/DL (ref 8.6–10)
CHLORIDE SERPL-SCNC: 98 MMOL/L (ref 98–107)
CREAT SERPL-MCNC: 1 MG/DL (ref 0.67–1.17)
DEPRECATED HCO3 PLAS-SCNC: 27 MMOL/L (ref 22–29)
EGFRCR SERPLBLD CKD-EPI 2021: 88 ML/MIN/1.73M2
GLUCOSE SERPL-MCNC: 95 MG/DL (ref 70–99)
POTASSIUM SERPL-SCNC: 4 MMOL/L (ref 3.4–5.3)
SODIUM SERPL-SCNC: 138 MMOL/L (ref 135–145)

## 2024-02-23 PROCEDURE — 36415 COLL VENOUS BLD VENIPUNCTURE: CPT

## 2024-02-23 PROCEDURE — 99207 PR NO CHARGE NURSE ONLY: CPT

## 2024-02-23 PROCEDURE — 80048 BASIC METABOLIC PNL TOTAL CA: CPT

## 2024-02-23 NOTE — PROGRESS NOTES
Milton Gonzalez is a 58 year old patient who comes in today for a Blood Pressure check.  Initial BP:  /80 (BP Location: Right arm, Patient Position: Sitting, Cuff Size: Adult Regular)   Pulse 66   SpO2 98%      66  Disposition: results routed to provider    Patient report he brought a home cuff - and readings are 140's over 80's  at home and in  clinic today 130/80 P: 66         TIMI Reina MA
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
## 2024-08-16 ENCOUNTER — MYC MEDICAL ADVICE (OUTPATIENT)
Dept: PEDIATRICS | Facility: CLINIC | Age: 59
End: 2024-08-16
Payer: COMMERCIAL

## 2024-08-16 DIAGNOSIS — I10 HYPERTENSION, UNSPECIFIED TYPE: ICD-10-CM

## 2024-08-16 DIAGNOSIS — E06.3 HYPOTHYROIDISM DUE TO HASHIMOTO'S THYROIDITIS: ICD-10-CM

## 2024-08-16 NOTE — TELEPHONE ENCOUNTER
See patient's MyChart message   - Patient requesting his lisinopril (ZESTRIL) 10 MG tablet, hydrochlorothiazide (HYDRODIURIL) 25 MG tablet and levothyroxine (SYNTHROID/LEVOTHROID) 175 MCG tablet medication be transferred to the [facility]     lisinopril (ZESTRIL) 10 MG tablet 90 tablet 3 2/8/2024 -- No   Sig - Route: Take 1 tablet (10 mg) by mouth daily - Oral     hydrochlorothiazide (HYDRODIURIL) 25 MG tablet 90 tablet 3 1/23/2024 -- No   Sig - Route: Take 1 tablet (25 mg) by mouth daily - Oral     levothyroxine (SYNTHROID/LEVOTHROID) 175 MCG tablet 90 tablet 3 1/28/2024 -- No   Sig - Route: Take 1 tablet (175 mcg) by mouth daily - Oral     Dr. Jimenez, please review and reorder with the updated pharmacy per the patient's request. Attempted pharmacy transfer but received a drug-drug interaction requiring your review.     Chaparrita ZUNIGA RN   [facility]

## 2024-08-18 RX ORDER — LEVOTHYROXINE SODIUM 175 UG/1
175 TABLET ORAL DAILY
Qty: 90 TABLET | Refills: 1 | Status: SHIPPED | OUTPATIENT
Start: 2024-08-18

## 2024-08-18 RX ORDER — LISINOPRIL 10 MG/1
10 TABLET ORAL DAILY
Qty: 90 TABLET | Refills: 1 | Status: SHIPPED | OUTPATIENT
Start: 2024-08-18

## 2024-08-18 RX ORDER — HYDROCHLOROTHIAZIDE 25 MG/1
25 TABLET ORAL DAILY
Qty: 90 TABLET | Refills: 1 | Status: SHIPPED | OUTPATIENT
Start: 2024-08-18

## 2024-08-19 NOTE — TELEPHONE ENCOUNTER
Sent a [employer] message to the patient   - Informed the patient that Dr. Jimenez sent in a prescription for the patient's hydrochlorothiazide (HYDRODIURIL) 25 MG tablet, levothyroxine (SYNTHROID/LEVOTHROID) 175 MCG tablet and lisinopril (ZESTRIL) 10 MG tablet medications to the [facility] [place] - [address] [facility] on 8/18/2024     hydrochlorothiazide (HYDRODIURIL) 25 MG tablet 90 tablet 1 8/18/2024 -- No   Sig - Route: Take 1 tablet (25 mg) by mouth daily - Oral     levothyroxine (SYNTHROID/LEVOTHROID) 175 MCG tablet 90 tablet 1 8/18/2024 -- No   Sig - Route: Take 1 tablet (175 mcg) by mouth daily - Oral     lisinopril (ZESTRIL) 10 MG tablet 90 tablet 1 8/18/2024 -- No   Sig - Route: Take 1 tablet (10 mg) by mouth daily - Oral     Chaparrita ZUNIGA RN   [facility]

## 2025-01-01 ENCOUNTER — MYC REFILL (OUTPATIENT)
Dept: PEDIATRICS | Facility: CLINIC | Age: 60
End: 2025-01-01
Payer: COMMERCIAL

## 2025-01-01 DIAGNOSIS — E06.3 HYPOTHYROIDISM DUE TO HASHIMOTO'S THYROIDITIS: ICD-10-CM

## 2025-01-01 DIAGNOSIS — I10 HYPERTENSION, UNSPECIFIED TYPE: ICD-10-CM

## 2025-01-02 RX ORDER — HYDROCHLOROTHIAZIDE 25 MG/1
25 TABLET ORAL DAILY
Qty: 90 TABLET | Refills: 1 | OUTPATIENT
Start: 2025-01-02

## 2025-01-02 RX ORDER — LISINOPRIL 10 MG/1
10 TABLET ORAL DAILY
Qty: 90 TABLET | Refills: 1 | OUTPATIENT
Start: 2025-01-02

## 2025-01-02 RX ORDER — LEVOTHYROXINE SODIUM 175 UG/1
175 TABLET ORAL DAILY
Qty: 90 TABLET | Refills: 1 | OUTPATIENT
Start: 2025-01-02

## 2025-01-28 DIAGNOSIS — I10 HYPERTENSION, UNSPECIFIED TYPE: ICD-10-CM

## 2025-01-28 RX ORDER — HYDROCHLOROTHIAZIDE 25 MG/1
25 TABLET ORAL DAILY
Qty: 90 TABLET | Refills: 0 | Status: SHIPPED | OUTPATIENT
Start: 2025-01-28

## 2025-02-24 ENCOUNTER — OFFICE VISIT (OUTPATIENT)
Dept: PEDIATRICS | Facility: CLINIC | Age: 60
End: 2025-02-24
Payer: COMMERCIAL

## 2025-02-24 VITALS
WEIGHT: 140.3 LBS | BODY MASS INDEX: 21.26 KG/M2 | OXYGEN SATURATION: 98 % | HEIGHT: 68 IN | TEMPERATURE: 97.4 F | SYSTOLIC BLOOD PRESSURE: 150 MMHG | RESPIRATION RATE: 16 BRPM | HEART RATE: 68 BPM | DIASTOLIC BLOOD PRESSURE: 78 MMHG

## 2025-02-24 DIAGNOSIS — Z00.00 ROUTINE GENERAL MEDICAL EXAMINATION AT A HEALTH CARE FACILITY: Primary | ICD-10-CM

## 2025-02-24 DIAGNOSIS — Z13.6 CARDIOVASCULAR SCREENING; LDL GOAL LESS THAN 160: ICD-10-CM

## 2025-02-24 DIAGNOSIS — E06.3 HYPOTHYROIDISM DUE TO HASHIMOTO'S THYROIDITIS: ICD-10-CM

## 2025-02-24 DIAGNOSIS — I10 HYPERTENSION, UNSPECIFIED TYPE: ICD-10-CM

## 2025-02-24 DIAGNOSIS — L98.9 SKIN LESION: ICD-10-CM

## 2025-02-24 DIAGNOSIS — F17.200 NICOTINE DEPENDENCE, UNCOMPLICATED, UNSPECIFIED NICOTINE PRODUCT TYPE: ICD-10-CM

## 2025-02-24 DIAGNOSIS — Z12.5 SCREENING FOR PROSTATE CANCER: ICD-10-CM

## 2025-02-24 DIAGNOSIS — F41.9 ANXIETY: ICD-10-CM

## 2025-02-24 PROCEDURE — 90471 IMMUNIZATION ADMIN: CPT | Performed by: INTERNAL MEDICINE

## 2025-02-24 PROCEDURE — 80053 COMPREHEN METABOLIC PANEL: CPT | Performed by: INTERNAL MEDICINE

## 2025-02-24 PROCEDURE — 84443 ASSAY THYROID STIM HORMONE: CPT | Performed by: INTERNAL MEDICINE

## 2025-02-24 PROCEDURE — 90673 RIV3 VACCINE NO PRESERV IM: CPT | Performed by: INTERNAL MEDICINE

## 2025-02-24 PROCEDURE — 90480 ADMN SARSCOV2 VAC 1/ONLY CMP: CPT | Performed by: INTERNAL MEDICINE

## 2025-02-24 PROCEDURE — 80061 LIPID PANEL: CPT | Performed by: INTERNAL MEDICINE

## 2025-02-24 PROCEDURE — 99214 OFFICE O/P EST MOD 30 MIN: CPT | Mod: 25 | Performed by: INTERNAL MEDICINE

## 2025-02-24 PROCEDURE — 36415 COLL VENOUS BLD VENIPUNCTURE: CPT | Performed by: INTERNAL MEDICINE

## 2025-02-24 PROCEDURE — 99396 PREV VISIT EST AGE 40-64: CPT | Mod: 25 | Performed by: INTERNAL MEDICINE

## 2025-02-24 PROCEDURE — G0103 PSA SCREENING: HCPCS | Performed by: INTERNAL MEDICINE

## 2025-02-24 PROCEDURE — 91320 SARSCV2 VAC 30MCG TRS-SUC IM: CPT | Performed by: INTERNAL MEDICINE

## 2025-02-24 RX ORDER — LISINOPRIL AND HYDROCHLOROTHIAZIDE 20; 25 MG/1; MG/1
1 TABLET ORAL DAILY
Qty: 90 TABLET | Refills: 3 | Status: SHIPPED | OUTPATIENT
Start: 2025-02-24

## 2025-02-24 RX ORDER — NICOTINE 21 MG/24HR
1 PATCH, TRANSDERMAL 24 HOURS TRANSDERMAL EVERY 24 HOURS
Qty: 42 PATCH | Refills: 0 | Status: SHIPPED | OUTPATIENT
Start: 2025-02-24 | End: 2025-04-07

## 2025-02-24 RX ORDER — NICOTINE 21 MG/24HR
1 PATCH, TRANSDERMAL 24 HOURS TRANSDERMAL EVERY 24 HOURS
Qty: 14 PATCH | Refills: 0 | Status: SHIPPED | OUTPATIENT
Start: 2025-04-07 | End: 2025-04-21

## 2025-02-24 RX ORDER — HYDROXYZINE HYDROCHLORIDE 25 MG/1
25 TABLET, FILM COATED ORAL 2 TIMES DAILY PRN
Qty: 40 TABLET | Refills: 0 | Status: SHIPPED | OUTPATIENT
Start: 2025-02-24

## 2025-02-24 SDOH — HEALTH STABILITY: PHYSICAL HEALTH: ON AVERAGE, HOW MANY MINUTES DO YOU ENGAGE IN EXERCISE AT THIS LEVEL?: 30 MIN

## 2025-02-24 SDOH — HEALTH STABILITY: PHYSICAL HEALTH: ON AVERAGE, HOW MANY DAYS PER WEEK DO YOU ENGAGE IN MODERATE TO STRENUOUS EXERCISE (LIKE A BRISK WALK)?: 3 DAYS

## 2025-02-24 ASSESSMENT — PAIN SCALES - GENERAL: PAINLEVEL_OUTOF10: NO PAIN (0)

## 2025-02-24 ASSESSMENT — SOCIAL DETERMINANTS OF HEALTH (SDOH): HOW OFTEN DO YOU GET TOGETHER WITH FRIENDS OR RELATIVES?: ONCE A WEEK

## 2025-02-24 NOTE — PROGRESS NOTES
Preventive Care Visit  [facility] BERTA Jimenez MD, Internal Medicine - Pediatrics  Feb 24, 2025      Assessment & Plan     (Z00.00) Routine general medical examination at a health care facility  (primary encounter diagnosis)    (I10) Hypertension, unspecified type  Comment:   Plan: lisinopril-hydrochlorothiazide (ZESTORETIC)         20-25 MG tablet        Poorly controlled, increase lisinopril from 10 to 20 mg and combine with hydrochlorothiazide.  Patient will forward home blood pressure readings 2 weeks.    (F41.9) Anxiety  Comment:   Plan: hydrOXYzine HCl (ATARAX) 25 MG tablet        Recent financial difficulties, not sleeping well.  Request something to manage anxiety at night and poor sleep.  Start hydroxyzine as needed side effects reviewed.  Bupropion may also be contributing to anxiety symptoms.  Reduce bupropion from 300 mg to 150 mg daily for the next few weeks then discontinue.    (E06.3) Hypothyroidism due to Hashimoto's thyroiditis  Comment:   Plan: TSH WITH FREE T4 REFLEX        Due for lab work.    (F17.200) Nicotine dependence, uncomplicated, unspecified nicotine product type  Comment:   Plan: [place] Quit Partner Referral, nicotine (NICODERM         CQ) 21 MG/24HR 24 hr patch, nicotine (NICODERM         CQ) 14 MG/24HR 24 hr patch, nicotine (NICODERM         CQ) 7 MG/24HR 24 hr patch           Previously started on bupropion for smoking cessation/was not helpful but remained on bupropion over the past year.  Encouraged to set a quit date, start nicotine patch taper.    (L98.9) Skin lesion  Comment:   Plan: Adult Dermatology  Referral        Atypical skin lesion, vertex.  Needs biopsy.  Referral to dermatology.    (Z13.6) CARDIOVASCULAR SCREENING; LDL GOAL LESS THAN 160  Comment:   Plan: Comprehensive metabolic panel (BMP + Alb, Alk         Phos, ALT, AST, Total. Bili, TP), Lipid panel         reflex to direct LDL Fasting          (Z12.5) Screening for prostate  cancer  Comment:   Plan: PSA, screen                    Nicotine/Tobacco Cessation  He reports that he has been smoking cigarettes. He started smoking about 36 years ago. He has a 30 pack-year smoking history. He has never used smokeless tobacco.  Nicotine/Tobacco Cessation Plan  Pharmacotherapies : Nicotine patch      Counseling  Appropriate preventive services were addressed with this patient via screening, questionnaire, or discussion as appropriate for fall prevention, nutrition, physical activity, Tobacco-use cessation, social engagement, weight loss and cognition.  Checklist reviewing preventive services available has been given to the patient.  Reviewed patient's diet, addressing concerns and/or questions.   He is at risk for lack of exercise and has been provided with information to increase physical activity for the benefit of his well-being.   The patient was instructed to see the dentist every 6 months.   He is at risk for psychosocial distress and has been provided with information to reduce risk.           Javi Durant is a 59 year old, presenting for the following:  Physical        2/24/2025     3:32 PM   Additional Questions   Roomed by trevon   Accompanied by na         2/24/2025     3:32 PM   Patient Reported Additional Medications   Patient reports taking the following new medications na          HPI          Health Care Directive  Patient does not have a Health Care Directive: Discussed advance care planning with patient; however, patient declined at this time.      2/24/2025   General Health   How would you rate your overall physical health? Good   Feel stress (tense, anxious, or unable to sleep) Very much   (!) STRESS CONCERN      2/24/2025   Nutrition   Three or more servings of calcium each day? (!) NO   Diet: Regular (no restrictions)   How many servings of fruit and vegetables per day? (!) 0-1   How many sweetened beverages each day? (!) 2         2/24/2025   Exercise   Days per week of  moderate/strenous exercise 3 days   Average minutes spent exercising at this level 30 min         2025   Social Factors   Frequency of gathering with friends or relatives Once a week   Worry food won't last until get money to buy more No   Food not last or not have enough money for food? No   Do you have housing? (Housing is defined as stable permanent housing and does not include staying ouside in a car, in a tent, in an abandoned building, in an overnight shelter, or couch-surfing.) Yes   Are you worried about losing your housing? No   Lack of transportation? No   Unable to get utilities (heat,electricity)? No         2025   Fall Risk   Fallen 2 or more times in the past year? No   Trouble with walking or balance? No          2025   Dental   Dentist two times every year? (!) NO            Today's PHQ-2 Score:       2025    10:58 AM   PHQ-2 (  [employer])   Q1: Little interest or pleasure in doing things 1   Q2: Feeling down, depressed or hopeless 1   PHQ-2 Score 2    Q1: Little interest or pleasure in doing things Several days   Q2: Feeling down, depressed or hopeless Several days   PHQ-2 Score 2       Patient-reported           2025   Substance Use   Alcohol more than 3/day or more than 7/wk Not Applicable   Do you use any other substances recreationally? (!) CANNABIS PRODUCTS     Social History     Tobacco Use    Smoking status: Every Day     Current packs/day: 0.00     Average packs/day: 1 pack/day for 30.0 years (30.0 ttl pk-yrs)     Types: Cigarettes     Start date: 1988     Last attempt to quit: 2018     Years since quittin.2    Smokeless tobacco: Never   Substance Use Topics    Alcohol use: No    Drug use: No           2025   STI Screening   New sexual partner(s) since last STI/HIV test? No   Last PSA:   PSA   Date Value Ref Range Status   2018 0.32 0 - 4 ug/L Final     Comment:     Assay Method:  Chemiluminescence using Siemens Vista analyzer     Prostate  Specific Antigen Screen   Date Value Ref Range Status   2022 0.30 0.00 - 3.50 ng/mL Final     ASCVD Risk   The 10-year ASCVD risk score (Kenia BLOOM, et al., 2019) is: 16.3%    Values used to calculate the score:      Age: 59 years      Sex: Male      Is Non- : No      Diabetic: No      Tobacco smoker: Yes      Systolic Blood Pressure: 150 mmHg      Is BP treated: Yes      HDL Cholesterol: 48 mg/dL      Total Cholesterol: 180 mg/dL           Reviewed and updated as needed this visit by Provider                    Patient Active Problem List   Diagnosis    Tobacco use disorder    CARDIOVASCULAR SCREENING; LDL GOAL LESS THAN 160    Chronic low back pain    Hypothyroidism due to Hashimoto's thyroiditis    Hypertension, unspecified type     Past Surgical History:   Procedure Laterality Date    COLONOSCOPY      COLONOSCOPY N/A 2021    Procedure: COLONOSCOPY;  Surgeon: Tracy Boateng MD;  Location:  GI    ENT SURGERY      tonsils    NO HISTORY OF SURGERY         Social History     Tobacco Use    Smoking status: Every Day     Current packs/day: 0.00     Average packs/day: 1 pack/day for 30.0 years (30.0 ttl pk-yrs)     Types: Cigarettes     Start date: 1988     Last attempt to quit: 2018     Years since quittin.2    Smokeless tobacco: Never   Substance Use Topics    Alcohol use: No     Family History   Problem Relation Age of Onset    Cerebrovascular Disease Maternal Grandfather     Cerebrovascular Disease Maternal Grandmother     Cancer - colorectal No family hx of     Prostate Cancer No family hx of     Colon Cancer No family hx of     Diabetes No family hx of     Hypertension No family hx of          Current Outpatient Medications   Medication Sig Dispense Refill    buPROPion (WELLBUTRIN SR) 150 MG 12 hr tablet 1 tablet daily for 5 days then increase to 1 tab BID (start 1 week prior to quit date) 120 tablet 1    hydrOXYzine HCl (ATARAX) 25 MG tablet Take 1  "tablet (25 mg) by mouth 2 times daily as needed for anxiety. 40 tablet 0    levothyroxine (SYNTHROID/LEVOTHROID) 175 MCG tablet Take 1 tablet (175 mcg) by mouth daily 90 tablet 1       Review of Systems  Constitutional, neuro, ENT, endocrine, pulmonary, cardiac, gastrointestinal, genitourinary, musculoskeletal, integument and psychiatric systems are negative, except as otherwise noted.     Objective    Exam  BP (!) 150/78   Pulse 68   Temp 97.4  F (36.3  C) (Temporal)   Resp 16   Ht 1.715 m (5' 7.5\")   Wt 63.6 kg (140 lb 4.8 oz)   SpO2 98%   BMI 21.65 kg/m     Estimated body mass index is 21.65 kg/m  as calculated from the following:    Height as of this encounter: 1.715 m (5' 7.5\").    Weight as of this encounter: 63.6 kg (140 lb 4.8 oz).    Physical Exam  GENERAL: alert and no distress  EYES: Eyes grossly normal to inspection, PERRL and conjunctivae and sclerae normal  HENT: ear canals and TM's normal, nose and mouth without ulcers or lesions  NECK: no adenopathy, no asymmetry, masses, or scars  RESP: lungs clear to auscultation - no rales, rhonchi or wheezes  CV: regular rate and rhythm, normal S1 S2, no S3 or S4, no murmur, click or rub, no peripheral edema  ABDOMEN: soft, nontender, no hepatosplenomegaly, no masses and bowel sounds normal  MS: no gross musculoskeletal defects noted, no edema  SKIN: Approximately 2.5 cm brown/black exophytic lesion on vertex with irregular border  NEURO: Normal strength and tone, mentation intact and speech normal  PSYCH: mentation appears normal, affect normal/bright        Signed Electronically by: Eldon Jimenez MD    "

## 2025-02-24 NOTE — PATIENT INSTRUCTIONS
(Z00.00) Routine general medical examination at a health care facility  (primary encounter diagnosis)    (I10) Hypertension, unspecified type  Comment:   Plan: lisinopril-hydrochlorothiazide (ZESTORETIC)         20-25 MG tablet        1)Stop lisinopril 10mg and hydrochlorothiazide 25mg tablets.         2) start lisinopril 20mg and hydrochlorothiazide 25mg combined in one pill           Send your home blood pressure readings in 2 weeks    (E06.3) Hypothyroidism due to Hashimoto's thyroiditis  Comment:   Plan: TSH WITH FREE T4 REFLEX          (F17.200) Tobacco use disorder  Comment:   Plan: start nicotine patch and taper after your quit date    (Z13.6) CARDIOVASCULAR SCREENING; LDL GOAL LESS THAN 160  Comment:   Plan: Comprehensive metabolic panel (BMP + Alb, Alk         Phos, ALT, AST, Total. Bili, TP), Lipid panel         reflex to direct LDL Fasting          (Z12.5) Screening for prostate cancer  Comment:   Plan: PSA, screen          (F41.9) Anxiety  Comment:   Plan: hydrOXYzine HCl (ATARAX) 25 MG tablet        1) reduce bupropion to 150mg once daily for 3 weeks then stop         2) start hydroxyzine as needed for anxiety and sleep (will cause sedation)    (F17.200) Nicotine dependence, uncomplicated, unspecified nicotine product type  Comment:   Plan: [place] Quit Partner Referral, nicotine (NICODERM         CQ) 21 MG/24HR 24 hr patch, nicotine (NICODERM         CQ) 14 MG/24HR 24 hr patch, nicotine (NICODERM         CQ) 7 MG/24HR 24 hr patch                 Preventive Care Advice   This is general advice given by our system to help you stay healthy. However, your care team may have specific advice just for you. Please talk to your care team about your preventive care needs.  Nutrition  Eat 5 or more servings of fruits and vegetables each day.  Try wheat bread, brown rice and whole grain pasta (instead of white bread, rice, and pasta).  Get enough calcium and vitamin D. Check the label on foods and aim for 100% of the  RDA (recommended daily allowance).  Lifestyle  Exercise at least 150 minutes each week  (30 minutes a day, 5 days a week).  Do muscle strengthening activities 2 days a week. These help control your weight and prevent disease.  No smoking.  Wear sunscreen to prevent skin cancer.  Have a dental exam and cleaning every 6 months.  Yearly exams  See your health care team every year to talk about:  Any changes in your health.  Any medicines your care team has prescribed.  Preventive care, family planning, and ways to prevent chronic diseases.  Shots (vaccines)   HPV shots (up to age 26), if you've never had them before.  Hepatitis B shots (up to age 59), if you've never had them before.  COVID-19 shot: Get this shot when it's due.  Flu shot: Get a flu shot every year.  Tetanus shot: Get a tetanus shot every 10 years.  Pneumococcal, hepatitis A, and RSV shots: Ask your care team if you need these based on your risk.  Shingles shot (for age 50 and up)  General health tests  Diabetes screening:  Starting at age 35, Get screened for diabetes at least every 3 years.  If you are younger than age 35, ask your care team if you should be screened for diabetes.  Cholesterol test: At age 39, start having a cholesterol test every 5 years, or more often if advised.  Bone density scan (DEXA): At age 50, ask your care team if you should have this scan for osteoporosis (brittle bones).  Hepatitis C: Get tested at least once in your life.  STIs (sexually transmitted infections)  Before age 24: Ask your care team if you should be screened for STIs.  After age 24: Get screened for STIs if you're at risk. You are at risk for STIs (including HIV) if:  You are sexually active with more than one person.  You don't use condoms every time.  You or a partner was diagnosed with a sexually transmitted infection.  If you are at risk for HIV, ask about PrEP medicine to prevent HIV.  Get tested for HIV at least once in your life, whether you are at  risk for HIV or not.  Cancer screening tests  Cervical cancer screening: If you have a cervix, begin getting regular cervical cancer screening tests starting at age 21.  Breast cancer scan (mammogram): If you've ever had breasts, begin having regular mammograms starting at age 40. This is a scan to check for breast cancer.  Colon cancer screening: It is important to start screening for colon cancer at age 45.  Have a colonoscopy test every 10 years (or more often if you're at risk) Or, ask your provider about stool tests like a FIT test every year or Cologuard test every 3 years.  To learn more about your testing options, visit:   .  For help making a decision, visit:   https://bit.ly/ys30465.  Prostate cancer screening test: If you have a prostate, ask your care team if a prostate cancer screening test (PSA) at age 55 is right for you.  Lung cancer screening: If you are a current or former smoker ages 50 to 80, ask your care team if ongoing lung cancer screenings are right for you.  For informational purposes only. Not to replace the advice of your health care provider. Copyright   2023 [facility]. All rights reserved. Clinically reviewed by the [facility]. [employer] 876262 - REV 01/24.  Eating Healthy Foods: Care Instructions  With every meal, you can make healthy food choices. Try to eat a variety of fruits, vegetables, whole grains, lean proteins, and low-fat dairy products. This can help you get the right balance of nutrients, including vitamins and minerals. Small changes add up over time. You can start by adding one healthy food to your meals each day.    Try to make half your plate fruits and vegetables, one-fourth whole grains, and one-fourth lean proteins. Try including dairy with your meals.   Eat more fruits and vegetables. Try to have them with most meals and snacks.   Foods for healthy eating        Fruits   These can be fresh, frozen, canned, or dried.  Try to  "choose whole fruit rather than fruit juice.  Eat a variety of colors.        Vegetables   These can be fresh, frozen, canned, or dried.  Beans, peas, and lentils count too.        Whole grains   Choose whole-grain breads, cereals, and noodles.  Try brown rice.        Lean proteins   These can include lean meat, poultry, fish, and eggs.  You can also have tofu, beans, peas, lentils, nuts, and seeds.        Dairy   Try milk, yogurt, and cheese.  Choose low-fat or fat-free when you can.  If you need to, use lactose-free milk or fortified plant-based milk products, such as soy milk.        Water   Drink water when you're thirsty.  Limit sugar-sweetened drinks, including soda, fruit drinks, and sports drinks.  Where can you learn more?  Go to https://www.Deliveroo.net/patiented  Enter T756 in the search box to learn more about \"Eating Healthy Foods: Care Instructions.\"  Current as of: September 20, 2023  Content Version: 14.3    2024 [employer].   Care instructions adapted under license by your healthcare professional. If you have questions about a medical condition or this instruction, always ask your healthcare professional. [employer] disclaims any warranty or liability for your use of this information.    Learning About Stress  What is stress?     Stress is your body's response to a hard situation. Your body can have a physical, emotional, or mental response. Stress is a fact of life for most people, and it affects everyone differently. What causes stress for you may not be stressful for someone else.  A lot of things can cause stress. You may feel stress when you go on a job interview, take a test, or run a race. This kind of short-term stress is normal and even useful. It can help you if you need to work hard or react quickly. For example, stress can help you finish an important job on time.  Long-term stress is caused by ongoing stressful situations or events. Examples of long-term stress " include long-term health problems, ongoing problems at work, or conflicts in your family. Long-term stress can harm your health.  How does stress affect your health?  When you are stressed, your body responds as though you are in danger. It makes hormones that speed up your heart, make you breathe faster, and give you a burst of energy. This is called the fight-or-flight stress response. If the stress is over quickly, your body goes back to normal and no harm is done.  But if stress happens too often or lasts too long, it can have bad effects. Long-term stress can make you more likely to get sick, and it can make symptoms of some diseases worse. If you tense up when you are stressed, you may develop neck, shoulder, or low back pain. Stress is linked to high blood pressure and heart disease.  Stress also harms your emotional health. It can make you peterson, tense, or depressed. Your relationships may suffer, and you may not do well at work or school.  What can you do to manage stress?  You can try these things to help manage stress:   Do something active. Exercise or activity can help reduce stress. Walking is a great way to get started. Even everyday activities such as housecleaning or yard work can help.  Try yoga or dianna chi. These techniques combine exercise and meditation. You may need some training at first to learn them.  Do something you enjoy. For example, listen to music or go to a movie. Practice your hobby or do volunteer work.  Meditate. This can help you relax, because you are not worrying about what happened before or what may happen in the future.  Do guided imagery. Imagine yourself in any setting that helps you feel calm. You can use online videos, books, or a teacher to guide you.  Do breathing exercises. For example:  From a standing position, bend forward from the waist with your knees slightly bent. Let your arms dangle close to the floor.  Breathe in slowly and deeply as you return to a standing  "position. Roll up slowly and lift your head last.  Hold your breath for just a few seconds in the standing position.  Breathe out slowly and bend forward from the waist.  Let your feelings out. Talk, laugh, cry, and express anger when you need to. Talking with supportive friends or family, a counselor, or a srini leader about your feelings is a healthy way to relieve stress. Avoid discussing your feelings with people who make you feel worse.  Write. It may help to write about things that are bothering you. This helps you find out how much stress you feel and what is causing it. When you know this, you can find better ways to cope.  What can you do to prevent stress?  You might try some of these things to help prevent stress:  Manage your time. This helps you find time to do the things you want and need to do.  Get enough sleep. Your body recovers from the stresses of the day while you are sleeping.  Get support. Your family, friends, and community can make a difference in how you experience stress.  Limit your news feed. Avoid or limit time on social media or news that may make you feel stressed.  Do something active. Exercise or activity can help reduce stress. Walking is a great way to get started.  Where can you learn more?  Go to https://www.Kingmaker.net/patiented  Enter N032 in the search box to learn more about \"Learning About Stress.\"  Current as of: October 24, 2023  Content Version: 14.3    2024 [employer].   Care instructions adapted under license by your healthcare professional. If you have questions about a medical condition or this instruction, always ask your healthcare professional. [employer] disclaims any warranty or liability for your use of this information.           Nicotine Transdermal System   Habitrol, Nicoderm C-Q    Uses  For quitting smoking.    Instructions  DO NOT take this medicine by mouth.    Avoid placing the patch near the breast.    Remove the patch after " 24 hours.    Keep the medicine at room temperature. Avoid heat and direct light.    This patch should not be cut.    Wash your hands before and after handling this medicine.    Remove old patch before applying new one. Change the location of the new patch.    If you have vivid dreams or trouble sleeping, you may remove the patch before going to sleep.    Ask your doctor or pharmacist about locations on your body where this patch can be used.    Remove the plastic liner that protects the sticky side of the patch before applying to the skin.    Be sure the area of skin is clean and dry before putting on a new patch.    Apply the patch to a clean, dry, hairless area.    Press the patch firmly for a few seconds to make sure it stays in place.    After removing the patch, fold it together and discard it out of reach of children and pets.    Please ask your doctor or pharmacist how you can safely dispose of used patches.    If the skin under the patch becomes irritated, remove the patch. Do not apply a new patch to the area until the skin feels better.    To avoid irritating your skin, use a different location for a new patch.    Apply the patch only to normal looking skin. Avoid areas of the skin that are red, have scrapes, or damaged.    If the patch falls off, apply a new a patch on a different location of the body.    Please tell your doctor and pharmacist about all the medicines you take. Include both prescription and over-the-counter medicines. Also tell them about any vitamins, herbal medicines, or anything else you take for your health.    If you need to stop this medicine, your doctor may wish to gradually reduce the dosage before stopping.    Do not use more than 1 patch at any one time.    Cautions  Tell your doctor and pharmacist if you ever had an allergic reaction to a medicine. Symptoms of an allergic reaction can include trouble breathing, skin rash, itching, swelling, or severe dizziness.    Do not use  the medication any more than instructed.    Avoid smoking while on this medicine. Smoking may increase your risk for stroke, heart attack, blood clots, high blood pressure, and other diseases of the heart and blood vessels.    Tell the doctor or pharmacist if you are pregnant, planning to be pregnant, or breastfeeding.    Ask your pharmacist if this medicine can interact with any of your other medicines. Be sure to tell them about all the medicines you take.    Please tell all your doctors and dentists that you are on this medicine before they provide care.    Side Effects  The following is a list of some common side effects from this medicine. Please speak with your doctor about what you should do if you experience these or other side effects.    skin irritation where medicine is applied    If you have any of the following side effects, you may be getting too much medicine. Please contact your doctor to let them know about these side effects.    diarrhea  dizziness  nausea  rapid heartbeat  vomiting    A few people may have an allergic reaction to this medicine. Symptoms can include difficulty breathing, skin rash, itching, swelling, or severe dizziness. If you notice any of these symptoms, seek medical help quickly.    Extra  Please speak with your doctor, nurse, or pharmacist if you have any questions about this medicine.      [email or web address]  IMPORTANT NOTE: This document tells you briefly how to take your medicine, but it does not tell you all there is to know about it. Your doctor or pharmacist may give you other documents about your medicine. Please talk to them if you have any questions. Always follow their advice. There is a more complete description of this medicine available in English. Scan this code on your smartphone or tablet or use the web address below. You can also ask your pharmacist for a printout. If you have any questions, please ask your pharmacist.   2021 [employer]      8604-6136 [employer]. All rights reserved. This information is not intended as a substitute for professional medical care. Always follow your healthcare professional's instructions.

## 2025-02-25 LAB
ALBUMIN SERPL BCG-MCNC: 4.9 G/DL (ref 3.5–5.2)
ALP SERPL-CCNC: 86 U/L (ref 40–150)
ALT SERPL W P-5'-P-CCNC: 19 U/L (ref 0–70)
ANION GAP SERPL CALCULATED.3IONS-SCNC: 12 MMOL/L (ref 7–15)
AST SERPL W P-5'-P-CCNC: 23 U/L (ref 0–45)
BILIRUB SERPL-MCNC: 0.6 MG/DL
BUN SERPL-MCNC: 10.9 MG/DL (ref 6–20)
CALCIUM SERPL-MCNC: 9.7 MG/DL (ref 8.8–10.4)
CHLORIDE SERPL-SCNC: 97 MMOL/L (ref 98–107)
CHOLEST SERPL-MCNC: 185 MG/DL
CREAT SERPL-MCNC: 0.93 MG/DL (ref 0.67–1.17)
EGFRCR SERPLBLD CKD-EPI 2021: >90 ML/MIN/1.73M2
FASTING STATUS PATIENT QL REPORTED: ABNORMAL
FASTING STATUS PATIENT QL REPORTED: ABNORMAL
GLUCOSE SERPL-MCNC: 93 MG/DL (ref 70–99)
HCO3 SERPL-SCNC: 28 MMOL/L (ref 22–29)
HDLC SERPL-MCNC: 56 MG/DL
LDLC SERPL CALC-MCNC: 117 MG/DL
NONHDLC SERPL-MCNC: 129 MG/DL
POTASSIUM SERPL-SCNC: 4.3 MMOL/L (ref 3.4–5.3)
PROT SERPL-MCNC: 7.1 G/DL (ref 6.4–8.3)
PSA SERPL DL<=0.01 NG/ML-MCNC: 0.25 NG/ML (ref 0–3.5)
SODIUM SERPL-SCNC: 137 MMOL/L (ref 135–145)
TRIGL SERPL-MCNC: 59 MG/DL
TSH SERPL DL<=0.005 MIU/L-ACNC: 3.6 UIU/ML (ref 0.3–4.2)

## 2025-02-26 ENCOUNTER — OFFICE VISIT (OUTPATIENT)
Dept: DERMATOLOGY | Facility: CLINIC | Age: 60
End: 2025-02-26
Payer: COMMERCIAL

## 2025-02-26 DIAGNOSIS — D48.5 NEOPLASM OF UNCERTAIN BEHAVIOR OF SKIN: Primary | ICD-10-CM

## 2025-02-26 DIAGNOSIS — L98.9 SKIN LESION: ICD-10-CM

## 2025-02-26 ASSESSMENT — PAIN SCALES - GENERAL: PAINLEVEL_OUTOF10: NO PAIN (0)

## 2025-02-26 NOTE — NURSING NOTE
Milton Gonzalez's chief complaint for this visit includes:  Chief Complaint   Patient presents with    Skin Check     Lesion on scalp that has been present for several years     PCP: Eldon Jimenez    Referring Provider:  Referred Self, MD  No address on file    There were no vitals taken for this visit.  No Pain (0)        Allergies   Allergen Reactions    Pcn [Penicillins]      As child         Do you need any medication refills at today's visit? No    Lexus York CMA

## 2025-02-26 NOTE — NURSING NOTE
The following medication was given:     MEDICATION:  Lidocaine with epinephrine 1% [id number]  ROUTE: SQ  SITE: see procedure note  DOSE: 1.25 mL  LOT #: [id number]  : [employer]  EXPIRATION DATE: 08-  NDC#: [id number]  Was there drug waste? 0.75 mL  Multi-dose vial: Yes    Lexus York CMA  February 26, 2025

## 2025-02-26 NOTE — LETTER
2/26/2025      Milton Gonzalez  [id number] [address] [facility] [facility] [address]      Dear Colleague,    Thank you for referring your patient, Milton Gonzalez, to the [facility] [place]. Please see a copy of my visit note below.    [facility] Note  Encounter Date: Feb 26, 2025  Office Visit     Reviewed patients past medical history and pertinent chart review prior to patients visit today.     Dermatology Problem List:  0. NUB left parietal scalp, shave biopsy 02/26/25 .    Family Hx: no family hx of skin cancer  Personal Hx: no personal hx of skin cancer  ____________________________________________    Assessment & Plan:     # Neoplasm of uncertain behavior:  left parietal scalp  DDx includes ISK vs SCC. Shave biopsy today.    Procedure Note: Biopsy by shave technique  The risks and benefits of the procedure were described to the patient. These include but are not limited to bleeding, infection, scar, incomplete removal, and non-diagnostic biopsy. Verbal informed consent was obtained. The above site(s) was cleansed with an alcohol pad and injected with 1% lidocaine with epinephrine. Once anesthesia was obtained, a biopsy(ies) was performed with Gilette blade. The tissue(s) was placed in a labeled container(s) with formalin and sent to pathology. Hemostasis was achieved with aluminum chloride. Vaseline and a bandage were applied to the wound(s). The patient tolerated the procedure well and was given post biopsy care instructions.      Follow up: prn pending bx results     Liseth Pandey PA-C  [facility]  Dermatology    _______________________________________    CC: Skin Check (Lesion on scalp that has been present for several years. Has never seen dermatology. Has no Hx of skin cancer and no known family Hx of skin cancer.)    HPI:  Mr. Milton Gonzalez is a(n) 59 year old male who presents today as a new patient for evaluation of a lesion of concern on the scalp. This  "developed after patient split his head open and he needed staples. This area never \"fully healed\". Lesion is asymptomatic.     Patient is otherwise feeling well, without additional skin concerns.      Physical Exam:  SKIN: Focused examination of scalp was performed.  -NUB, left parietal scalp, 1.8 x 1.0 waxy brown to tan plaque     - No other lesions of concern on areas examined.     Medications:  Current Outpatient Medications   Medication Sig Dispense Refill     buPROPion (WELLBUTRIN SR) 150 MG 12 hr tablet 1 tablet daily for 5 days then increase to 1 tab BID (start 1 week prior to quit date) 120 tablet 1     hydrOXYzine HCl (ATARAX) 25 MG tablet Take 1 tablet (25 mg) by mouth 2 times daily as needed for anxiety. 40 tablet 0     levothyroxine (SYNTHROID/LEVOTHROID) 175 MCG tablet Take 1 tablet (175 mcg) by mouth daily 90 tablet 1     lisinopril-hydrochlorothiazide (ZESTORETIC) 20-25 MG tablet Take 1 tablet by mouth daily. 90 tablet 3     naproxen sodium (ANAPROX) 220 MG tablet Take 220 mg by mouth 2 times daily (with meals)       [START ON 4/7/2025] nicotine (NICODERM CQ) 14 MG/24HR 24 hr patch Place 1 patch over 24 hours onto the skin every 24 hours for 14 days. 14 patch 0     nicotine (NICODERM CQ) 21 MG/24HR 24 hr patch Place 1 patch over 24 hours onto the skin every 24 hours. 42 patch 0     [START ON 4/22/2025] nicotine (NICODERM CQ) 7 MG/24HR 24 hr patch Place 1 patch over 24 hours onto the skin every 24 hours for 14 days. 14 patch 0     No current facility-administered medications for this visit.      Past Medical History:   Patient Active Problem List   Diagnosis     Tobacco use disorder     CARDIOVASCULAR SCREENING; LDL GOAL LESS THAN 160     Chronic low back pain     Hypothyroidism due to Hashimoto's thyroiditis     Hypertension, unspecified type     Past Medical History:   Diagnosis Date     Hypertension      Rotator cuff disorder 10/15/2008     Tobacco use disorder        CC Referred Self, MD  No " address on file on close of this encounter.       Again, thank you for allowing me to participate in the care of your patient.        Sincerely,        Liseth Pandey PA-C    Electronically signed

## 2025-02-26 NOTE — PATIENT INSTRUCTIONS
Wound Care After a Biopsy    What is a skin biopsy?  A skin biopsy allows the doctor to examine a very small piece of tissue under the microscope to determine the diagnosis and the best treatment for the skin condition. A local anesthetic (numbing medicine) is injected with a very small needle into the skin area to be tested. A small piece of skin is taken from the area. Sometimes a suture (stitch) is used.     What are the risks of a skin biopsy?  I will experience scar, bleeding, swelling, pain, crusting and redness. I may experience incomplete removal or recurrence. Risks of this procedure are excessive bleeding, bruising, infection, nerve damage, numbness, thick (hypertrophic or keloidal) scar and non-diagnostic biopsy.    How should I care for my wound for the first 24 hours?  Keep the wound dry and covered for 24 hours  If it bleeds, hold direct pressure on the area for 15 minutes. If bleeding does not stop, call us or go to the emergency room  Avoid strenuous exercise the first 1-2 days or as your doctor instructs you    How should I care for the wound after 24 hours?  After 24 hours, remove the bandage  You may bathe or shower as normal  If you had a scalp biopsy, you can shampoo as usual and can use shower water to clean the biopsy site daily  Clean the wound once a day with gentle soap and water  Do not scrub, be gentle  Apply white petroleum/Vaseline after cleaning the wound with a cotton swab or a clean finger, and keep the site covered with a Bandaid /bandage. Bandages are not necessary with a scalp biopsy  If you are unable to cover the site with a Bandaid /bandage, re-apply ointment 2-3 times a day to keep the site moist. Moisture will help with healing  Avoid strenuous activity for first 1-2 days  Avoid lakes, rivers, pools, and oceans until the stitches are removed or the site is healed    How do I clean my wound?  Wash hands thoroughly with soap or use hand  before all wound care  Clean  the wound with gentle soap and water  Apply white petroleum/Vaseline  to wound after it is clean  Replace the Bandaid /bandage to keep the wound covered for the first few days or as instructed by your doctor  If you had a scalp biopsy, warm shower water to the area on a daily basis should suffice    What should I use to clean my wound?   Cotton-tipped applicators (Qtips )  White petroleum jelly (Vaseline ). Use a clean new container and use Q-tips to apply.  Bandaids  as needed  Gentle soap     How should I care for my wound long term?  Do not get your wound dirty  Keep up with wound care for one week or until the area is healed.  A small scab will form and fall off by itself when the area is completely healed. The area will be red and will become pink in color as it heals. Sun protection is very important for how your scar will turn out. Sunscreen with an SPF 30 or greater is recommended once the area is healed.  You should have some soreness but it should be mild and slowly go away over several days. Talk to your doctor about using tylenol for pain,    When should I call my doctor?  If you have increased:   Pain or swelling  Pus or drainage (clear or slightly yellow drainage is ok)  Temperature over 100F  Spreading redness or warmth around wound    When will I hear about my results?  The biopsy results can take 2 weeks to come back.  Your results will automatically release to [employer] before your provider has even reviewed them.  The clinic will call you with the results, send you a [employer] message, or have you schedule a follow-up clinic or phone time to discuss the results.  Contact our clinics if you do not hear from us in 2 weeks.    Who should I call with questions?  [facility]: [phone]  [facility]: [phone]  For urgent needs outside of business hours call the [facility] at [phone] and ask for the dermatology resident on call

## 2025-02-26 NOTE — PROGRESS NOTES
"[facility] Dermatology Note  Encounter Date: Feb 26, 2025  Office Visit     Reviewed patients past medical history and pertinent chart review prior to patients visit today.     Dermatology Problem List:  0. NUB left parietal scalp, shave biopsy 02/26/25 .    Family Hx: no family hx of skin cancer  Personal Hx: no personal hx of skin cancer  ____________________________________________    Assessment & Plan:     # Neoplasm of uncertain behavior:  left parietal scalp  DDx includes ISK vs SCC. Shave biopsy today.    Procedure Note: Biopsy by shave technique  The risks and benefits of the procedure were described to the patient. These include but are not limited to bleeding, infection, scar, incomplete removal, and non-diagnostic biopsy. Verbal informed consent was obtained. The above site(s) was cleansed with an alcohol pad and injected with 1% lidocaine with epinephrine. Once anesthesia was obtained, a biopsy(ies) was performed with Gilette blade. The tissue(s) was placed in a labeled container(s) with formalin and sent to pathology. Hemostasis was achieved with aluminum chloride and electrocautery. Vaseline and a bandage were applied to the wound(s). The patient tolerated the procedure well and was given post biopsy care instructions.      Follow up: prn pending bx results     Liseth Pandey PA-C  [facility]  Dermatology    _______________________________________    CC: Skin Check (Lesion on scalp that has been present for several years. Has never seen dermatology. Has no Hx of skin cancer and no known family Hx of skin cancer.)    HPI:  Mr. Milton Gonzalez is a(n) 59 year old male who presents today as a new patient for evaluation of a lesion of concern on the scalp. This developed after patient split his head open and he needed staples. This area never \"fully healed\". Lesion is asymptomatic.     Patient is otherwise feeling well, without additional skin concerns.      Physical Exam:  SKIN: " Focused examination of scalp was performed.  -NUB, left parietal scalp, 1.8 x 1.0 waxy brown to tan plaque     - No other lesions of concern on areas examined.     Medications:  Current Outpatient Medications   Medication Sig Dispense Refill    buPROPion (WELLBUTRIN SR) 150 MG 12 hr tablet 1 tablet daily for 5 days then increase to 1 tab BID (start 1 week prior to quit date) 120 tablet 1    hydrOXYzine HCl (ATARAX) 25 MG tablet Take 1 tablet (25 mg) by mouth 2 times daily as needed for anxiety. 40 tablet 0    levothyroxine (SYNTHROID/LEVOTHROID) 175 MCG tablet Take 1 tablet (175 mcg) by mouth daily 90 tablet 1    lisinopril-hydrochlorothiazide (ZESTORETIC) 20-25 MG tablet Take 1 tablet by mouth daily. 90 tablet 3    naproxen sodium (ANAPROX) 220 MG tablet Take 220 mg by mouth 2 times daily (with meals)      [START ON 4/7/2025] nicotine (NICODERM CQ) 14 MG/24HR 24 hr patch Place 1 patch over 24 hours onto the skin every 24 hours for 14 days. 14 patch 0    nicotine (NICODERM CQ) 21 MG/24HR 24 hr patch Place 1 patch over 24 hours onto the skin every 24 hours. 42 patch 0    [START ON 4/22/2025] nicotine (NICODERM CQ) 7 MG/24HR 24 hr patch Place 1 patch over 24 hours onto the skin every 24 hours for 14 days. 14 patch 0     No current facility-administered medications for this visit.      Past Medical History:   Patient Active Problem List   Diagnosis    Tobacco use disorder    CARDIOVASCULAR SCREENING; LDL GOAL LESS THAN 160    Chronic low back pain    Hypothyroidism due to Hashimoto's thyroiditis    Hypertension, unspecified type     Past Medical History:   Diagnosis Date    Hypertension     Rotator cuff disorder 10/15/2008    Tobacco use disorder        CC Referred Self, MD  No address on file on close of this encounter.

## 2025-03-02 LAB
PATH REPORT.COMMENTS IMP SPEC: NORMAL
PATH REPORT.COMMENTS IMP SPEC: NORMAL
PATH REPORT.FINAL DX SPEC: NORMAL
PATH REPORT.GROSS SPEC: NORMAL
PATH REPORT.MICROSCOPIC SPEC OTHER STN: NORMAL
PATH REPORT.RELEVANT HX SPEC: NORMAL

## 2025-03-04 ENCOUNTER — TELEPHONE (OUTPATIENT)
Dept: DERMATOLOGY | Facility: CLINIC | Age: 60
End: 2025-03-04
Payer: COMMERCIAL

## 2025-03-04 NOTE — TELEPHONE ENCOUNTER
Writer called pt to discuss pathology results. Pt stated that the wound is healing well with no signs of infection. Pt denied having any questions or concerns at this time.      Kmaila Mario RN on 3/4/2025 at 1:10 PM      Final Diagnosis  Left parietal scalp:  - Seborrheic keratosis, inflamed - (see description)  Electronically signed by Greg Márquez MD on 3/2/2025 at  4:31 PM        Result Notes     Liseth Pandey PA-C  3/3/2025  9:02 AM CST Back to Top    Please let patient know that biopsy(s) showed the following with the below treatment recommendations:     A. Left parietal scalp, inflamed seborrheic keratosis, this is a benign lesion and no further treatment needed at this time.

## 2025-03-27 ENCOUNTER — OFFICE VISIT (OUTPATIENT)
Dept: PEDIATRICS | Facility: CLINIC | Age: 60
End: 2025-03-27
Payer: COMMERCIAL

## 2025-03-27 VITALS
OXYGEN SATURATION: 99 % | HEART RATE: 63 BPM | BODY MASS INDEX: 20.95 KG/M2 | RESPIRATION RATE: 16 BRPM | DIASTOLIC BLOOD PRESSURE: 68 MMHG | TEMPERATURE: 97.9 F | SYSTOLIC BLOOD PRESSURE: 114 MMHG | HEIGHT: 68 IN | WEIGHT: 138.2 LBS

## 2025-03-27 DIAGNOSIS — F17.200 TOBACCO USE DISORDER: ICD-10-CM

## 2025-03-27 DIAGNOSIS — I10 HYPERTENSION, UNSPECIFIED TYPE: Primary | ICD-10-CM

## 2025-03-27 DIAGNOSIS — G47.00 INSOMNIA, UNSPECIFIED TYPE: ICD-10-CM

## 2025-03-27 DIAGNOSIS — F41.9 ANXIETY: ICD-10-CM

## 2025-03-27 PROCEDURE — 99214 OFFICE O/P EST MOD 30 MIN: CPT | Performed by: INTERNAL MEDICINE

## 2025-03-27 PROCEDURE — 3074F SYST BP LT 130 MM HG: CPT | Performed by: INTERNAL MEDICINE

## 2025-03-27 PROCEDURE — 3078F DIAST BP <80 MM HG: CPT | Performed by: INTERNAL MEDICINE

## 2025-03-27 PROCEDURE — 1126F AMNT PAIN NOTED NONE PRSNT: CPT | Performed by: INTERNAL MEDICINE

## 2025-03-27 RX ORDER — TRAZODONE HYDROCHLORIDE 50 MG/1
50 TABLET ORAL
Qty: 30 TABLET | Refills: 1 | Status: SHIPPED | OUTPATIENT
Start: 2025-03-27

## 2025-03-27 ASSESSMENT — PAIN SCALES - GENERAL: PAINLEVEL_OUTOF10: NO PAIN (0)

## 2025-03-27 NOTE — PROGRESS NOTES
Assessment & Plan     (I10) Hypertension, unspecified type  (primary encounter diagnosis)  Comment:   Plan:   Improved, adequate control continue current medications    (G47.00) Insomnia, unspecified type  Comment:   Plan: Adult Sleep Eval & Management          Referral, traZODone (DESYREL) 50 MG tablet     Discussed sleep hygiene and insomnia-see AVS.  Start trazodone as needed.  Sleep medicine referral              (F41.9) Anxiety  Comment:   Plan: improving-hydroxyzine prn    (F17.200) Tobacco use disorder  Comment:   Plan: planning to set quit date.  Nicotine patch        Subjective   Bhargav is a 59 year old, presenting for the following health issues:  Hypertension        3/27/2025     1:31 PM   Additional Questions   Roomed by Isa   Accompanied by self         3/27/2025     1:31 PM   Patient Reported Additional Medications   Patient reports taking the following new medications no     History of Present Illness       Reason for visit:  Blood pressure retake He is missing 1 dose(s) of medications per week.  He is not taking prescribed medications regularly due to remembering to take.        Hypertension, unspecified type     Hypertension follow-up-lisinopril recently increased.  Tolerating regimen without side effects.    Insomnia, unspecified type  Anxiety          History of insomnia, anxiety.  Recently started hydroxyzine as needed with some improvement.  Persistent insomnia    Tobacco use disorder      Continues to work on smoking cessation    Patient Active Problem List   Diagnosis    Tobacco use disorder    CARDIOVASCULAR SCREENING; LDL GOAL LESS THAN 160    Chronic low back pain    Hypothyroidism due to Hashimoto's thyroiditis    Hypertension, unspecified type    Anxiety     Current Outpatient Medications   Medication Sig Dispense Refill    buPROPion (WELLBUTRIN SR) 150 MG 12 hr tablet 1 tablet daily for 5 days then increase to 1 tab BID (start 1 week prior to quit date) 120 tablet 1     "hydrOXYzine HCl (ATARAX) 25 MG tablet Take 1 tablet (25 mg) by mouth 2 times daily as needed for anxiety. 40 tablet 0    levothyroxine (SYNTHROID/LEVOTHROID) 175 MCG tablet Take 1 tablet (175 mcg) by mouth daily 90 tablet 1    lisinopril-hydrochlorothiazide (ZESTORETIC) 20-25 MG tablet Take 1 tablet by mouth daily. 90 tablet 3    naproxen sodium (ANAPROX) 220 MG tablet Take 220 mg by mouth 2 times daily (with meals)      [START ON 4/7/2025] nicotine (NICODERM CQ) 14 MG/24HR 24 hr patch Place 1 patch over 24 hours onto the skin every 24 hours for 14 days. 14 patch 0    nicotine (NICODERM CQ) 21 MG/24HR 24 hr patch Place 1 patch over 24 hours onto the skin every 24 hours. 42 patch 0    [START ON 4/22/2025] nicotine (NICODERM CQ) 7 MG/24HR 24 hr patch Place 1 patch over 24 hours onto the skin every 24 hours for 14 days. 14 patch 0                           Objective    /68 (BP Location: Right arm, Patient Position: Sitting, Cuff Size: Adult Regular)   Pulse 63   Temp 97.9  F (36.6  C) (Temporal)   Resp 16   Ht 1.715 m (5' 7.5\")   Wt 62.7 kg (138 lb 3.2 oz)   SpO2 99%   BMI 21.33 kg/m    Body mass index is 21.33 kg/m .  Physical Exam   GENERAL: alert and no distress  RESP: lungs clear to auscultation - no rales, rhonchi or wheezes  CV: regular rate and rhythm, normal S1 S2, no S3 or S4, no murmur, click or rub, no peripheral edema  PSYCH: mentation appears normal, affect normal/bright            Signed Electronically by: Eldon Jimenez MD    "

## 2025-03-29 PROBLEM — F41.9 ANXIETY: Status: ACTIVE | Noted: 2025-03-29

## 2025-05-06 ENCOUNTER — MYC REFILL (OUTPATIENT)
Dept: PEDIATRICS | Facility: CLINIC | Age: 60
End: 2025-05-06
Payer: COMMERCIAL

## 2025-05-06 DIAGNOSIS — E06.3 HYPOTHYROIDISM DUE TO HASHIMOTO'S THYROIDITIS: ICD-10-CM

## 2025-05-06 RX ORDER — LEVOTHYROXINE SODIUM 175 UG/1
175 TABLET ORAL DAILY
Qty: 90 TABLET | Refills: 2 | Status: SHIPPED | OUTPATIENT
Start: 2025-05-06

## (undated) DEVICE — KIT ENDO TURNOVER/PROCEDURE W/CLEAN A SCOPE LINERS 103888

## (undated) RX ORDER — FENTANYL CITRATE 50 UG/ML
INJECTION, SOLUTION INTRAMUSCULAR; INTRAVENOUS
Status: DISPENSED
Start: 2021-07-06

## (undated) RX ORDER — SIMETHICONE 40MG/0.6ML
SUSPENSION, DROPS(FINAL DOSAGE FORM)(ML) ORAL
Status: DISPENSED
Start: 2021-07-06